# Patient Record
Sex: FEMALE | Race: BLACK OR AFRICAN AMERICAN | NOT HISPANIC OR LATINO | Employment: OTHER | URBAN - METROPOLITAN AREA
[De-identification: names, ages, dates, MRNs, and addresses within clinical notes are randomized per-mention and may not be internally consistent; named-entity substitution may affect disease eponyms.]

---

## 2018-10-28 ENCOUNTER — HISTORICAL (OUTPATIENT)
Dept: ADMINISTRATIVE | Facility: HOSPITAL | Age: 73
End: 2018-10-28

## 2018-10-31 LAB — FINAL CULTURE: NO GROWTH

## 2018-11-18 ENCOUNTER — HOSPITAL ENCOUNTER (OUTPATIENT)
Dept: NUTRITION | Facility: HOSPITAL | Age: 73
End: 2018-11-30
Attending: INTERNAL MEDICINE | Admitting: INTERNAL MEDICINE

## 2018-11-18 LAB
BUN SERPL-MCNC: 9 MG/DL (ref 7–18)
CALCIUM SERPL-MCNC: 8.4 MG/DL (ref 8.5–10.1)
CHLORIDE SERPL-SCNC: 110 MMOL/L (ref 98–107)
CO2 SERPL-SCNC: 25 MMOL/L (ref 21–32)
CREAT SERPL-MCNC: 0.84 MG/DL (ref 0.55–1.02)
CREAT/UREA NIT SERPL: 10.7
ERYTHROCYTE [DISTWIDTH] IN BLOOD BY AUTOMATED COUNT: 19.7 % (ref 11.5–17)
GLUCOSE SERPL-MCNC: 106 MG/DL (ref 74–106)
HCT VFR BLD AUTO: 34.6 % (ref 37–47)
HGB BLD-MCNC: 10.5 GM/DL (ref 12–16)
MCH RBC QN AUTO: 30 PG (ref 27–31)
MCHC RBC AUTO-ENTMCNC: 30.3 GM/DL (ref 33–36)
MCV RBC AUTO: 98.9 FL (ref 80–94)
PLATELET # BLD AUTO: 371 X10(3)/MCL (ref 130–400)
PMV BLD AUTO: 10.6 FL (ref 9.4–12.4)
POTASSIUM SERPL-SCNC: 3.9 MMOL/L (ref 3.5–5.1)
RBC # BLD AUTO: 3.5 X10(6)/MCL (ref 4.2–5.4)
SODIUM SERPL-SCNC: 142 MMOL/L (ref 136–145)
WBC # SPEC AUTO: 8.8 X10(3)/MCL (ref 4.5–11.5)

## 2018-11-19 LAB
ABS NEUT (OLG): 11.89 X10(3)/MCL (ref 2.1–9.2)
APTT PPP: 31.2 SECOND(S) (ref 24.8–36.9)
BASOPHILS # BLD AUTO: 0 X10(3)/MCL (ref 0–0.2)
BASOPHILS NFR BLD AUTO: 0 %
BUN SERPL-MCNC: 7 MG/DL (ref 7–18)
CALCIUM SERPL-MCNC: 8.8 MG/DL (ref 8.5–10.1)
CHLORIDE SERPL-SCNC: 109 MMOL/L (ref 98–107)
CO2 SERPL-SCNC: 24 MMOL/L (ref 21–32)
CREAT SERPL-MCNC: 0.77 MG/DL (ref 0.55–1.02)
CREAT/UREA NIT SERPL: 9.1
EOSINOPHIL # BLD AUTO: 0.2 X10(3)/MCL (ref 0–0.9)
EOSINOPHIL NFR BLD AUTO: 1 %
ERYTHROCYTE [DISTWIDTH] IN BLOOD BY AUTOMATED COUNT: 19.7 % (ref 11.5–17)
FERRITIN SERPL-MCNC: 768.3 NG/ML (ref 8–388)
GLUCOSE SERPL-MCNC: 127 MG/DL (ref 74–106)
H PYLORI AB SER IA-ACNC: NEGATIVE
HCT VFR BLD AUTO: 36.4 % (ref 37–47)
HGB BLD-MCNC: 11.1 GM/DL (ref 12–16)
INR PPP: 1.2 (ref 0–1.3)
IRON SATN MFR SERPL: 34.3 % (ref 20–50)
IRON SERPL-MCNC: 71 MCG/DL (ref 50–175)
LYMPHOCYTES # BLD AUTO: 0.7 X10(3)/MCL (ref 0.6–4.6)
LYMPHOCYTES NFR BLD AUTO: 5 %
MCH RBC QN AUTO: 29.6 PG (ref 27–31)
MCHC RBC AUTO-ENTMCNC: 30.5 GM/DL (ref 33–36)
MCV RBC AUTO: 97.1 FL (ref 80–94)
MONOCYTES # BLD AUTO: 0.1 X10(3)/MCL (ref 0.1–1.3)
MONOCYTES NFR BLD AUTO: 1 %
NEUTROPHILS # BLD AUTO: 11.89 X10(3)/MCL (ref 2.1–9.2)
NEUTROPHILS NFR BLD AUTO: 92 %
PLATELET # BLD AUTO: 353 X10(3)/MCL (ref 130–400)
PMV BLD AUTO: 10.3 FL (ref 9.4–12.4)
POTASSIUM SERPL-SCNC: 3.5 MMOL/L (ref 3.5–5.1)
PROTHROMBIN TIME: 15.9 SECOND(S) (ref 12.2–14.7)
RBC # BLD AUTO: 3.75 X10(6)/MCL (ref 4.2–5.4)
SODIUM SERPL-SCNC: 143 MMOL/L (ref 136–145)
TIBC SERPL-MCNC: 207 MCG/DL (ref 250–450)
TRANSFERRIN SERPL-MCNC: 156 MG/DL (ref 200–360)
WBC # SPEC AUTO: 13 X10(3)/MCL (ref 4.5–11.5)

## 2018-11-20 LAB
ABS NEUT (OLG): 9.24 X10(3)/MCL (ref 2.1–9.2)
ALBUMIN SERPL-MCNC: 2.8 GM/DL (ref 3.4–5)
ALBUMIN/GLOB SERPL: 1.1 {RATIO}
ALP SERPL-CCNC: 83 UNIT/L (ref 38–126)
ALT SERPL-CCNC: 65 UNIT/L (ref 12–78)
AST SERPL-CCNC: 58 UNIT/L (ref 15–37)
BASOPHILS # BLD AUTO: 0 X10(3)/MCL (ref 0–0.2)
BASOPHILS NFR BLD AUTO: 0 %
BILIRUB SERPL-MCNC: 1.2 MG/DL (ref 0.2–1)
BILIRUBIN DIRECT+TOT PNL SERPL-MCNC: 0.4 MG/DL (ref 0–0.2)
BILIRUBIN DIRECT+TOT PNL SERPL-MCNC: 0.8 MG/DL (ref 0–0.8)
BUN SERPL-MCNC: 9 MG/DL (ref 7–18)
CALCIUM SERPL-MCNC: 8.6 MG/DL (ref 8.5–10.1)
CHLORIDE SERPL-SCNC: 109 MMOL/L (ref 98–107)
CO2 SERPL-SCNC: 25 MMOL/L (ref 21–32)
CREAT SERPL-MCNC: 0.82 MG/DL (ref 0.55–1.02)
EOSINOPHIL # BLD AUTO: 0.1 X10(3)/MCL (ref 0–0.9)
EOSINOPHIL NFR BLD AUTO: 1 %
ERYTHROCYTE [DISTWIDTH] IN BLOOD BY AUTOMATED COUNT: 19.7 % (ref 11.5–17)
GLOBULIN SER-MCNC: 2.6 GM/DL (ref 2.4–3.5)
GLUCOSE SERPL-MCNC: 83 MG/DL (ref 74–106)
HCT VFR BLD AUTO: 31.9 % (ref 37–47)
HGB BLD-MCNC: 10.1 GM/DL (ref 12–16)
LYMPHOCYTES # BLD AUTO: 1.1 X10(3)/MCL (ref 0.6–4.6)
LYMPHOCYTES NFR BLD AUTO: 9 %
MCH RBC QN AUTO: 30.2 PG (ref 27–31)
MCHC RBC AUTO-ENTMCNC: 31.7 GM/DL (ref 33–36)
MCV RBC AUTO: 95.5 FL (ref 80–94)
MONOCYTES # BLD AUTO: 1 X10(3)/MCL (ref 0.1–1.3)
MONOCYTES NFR BLD AUTO: 9 %
NEUTROPHILS # BLD AUTO: 9.24 X10(3)/MCL (ref 2.1–9.2)
NEUTROPHILS NFR BLD AUTO: 80 %
PLATELET # BLD AUTO: 278 X10(3)/MCL (ref 130–400)
PMV BLD AUTO: 10.7 FL (ref 9.4–12.4)
POTASSIUM SERPL-SCNC: 3.6 MMOL/L (ref 3.5–5.1)
PROT SERPL-MCNC: 5.4 GM/DL (ref 6.4–8.2)
RBC # BLD AUTO: 3.34 X10(6)/MCL (ref 4.2–5.4)
SODIUM SERPL-SCNC: 143 MMOL/L (ref 136–145)
WBC # SPEC AUTO: 11.6 X10(3)/MCL (ref 4.5–11.5)

## 2018-11-21 LAB
INR PPP: 1.3 (ref 0–1.3)
PROTHROMBIN TIME: 16.4 SECOND(S) (ref 12.2–14.7)

## 2018-11-23 LAB
ABS NEUT (OLG): 10.07 X10(3)/MCL (ref 2.1–9.2)
BASOPHILS # BLD AUTO: 0 X10(3)/MCL (ref 0–0.2)
BASOPHILS NFR BLD AUTO: 0 %
CROSSMATCH INTERPRETATION: NORMAL
EOSINOPHIL # BLD AUTO: 0.1 X10(3)/MCL (ref 0–0.9)
EOSINOPHIL NFR BLD AUTO: 1 %
ERYTHROCYTE [DISTWIDTH] IN BLOOD BY AUTOMATED COUNT: 19.5 % (ref 11.5–17)
GROUP & RH: NORMAL
HCT VFR BLD AUTO: 27.6 % (ref 37–47)
HGB BLD-MCNC: 8.4 GM/DL (ref 12–16)
INR PPP: 1.6 (ref 0–1.3)
LYMPHOCYTES # BLD AUTO: 1 X10(3)/MCL (ref 0.6–4.6)
LYMPHOCYTES NFR BLD AUTO: 9 %
MCH RBC QN AUTO: 29.4 PG (ref 27–31)
MCHC RBC AUTO-ENTMCNC: 30.4 GM/DL (ref 33–36)
MCV RBC AUTO: 96.5 FL (ref 80–94)
MONOCYTES # BLD AUTO: 0.9 X10(3)/MCL (ref 0.1–1.3)
MONOCYTES NFR BLD AUTO: 7 %
NEUTROPHILS # BLD AUTO: 10.07 X10(3)/MCL (ref 2.1–9.2)
NEUTROPHILS NFR BLD AUTO: 83 %
NRBC BLD AUTO-RTO: 0.2 % (ref 0–0.2)
PLATELET # BLD AUTO: 182 X10(3)/MCL (ref 130–400)
PMV BLD AUTO: 11.3 FL (ref 9.4–12.4)
PRODUCT READY: NORMAL
PROTHROMBIN TIME: 19.5 SECOND(S) (ref 12.2–14.7)
RBC # BLD AUTO: 2.86 X10(6)/MCL (ref 4.2–5.4)
WBC # SPEC AUTO: 12.2 X10(3)/MCL (ref 4.5–11.5)

## 2018-11-24 LAB
ABS NEUT (OLG): 5.99 X10(3)/MCL (ref 2.1–9.2)
BASOPHILS # BLD AUTO: 0 X10(3)/MCL (ref 0–0.2)
BASOPHILS NFR BLD AUTO: 0 %
EOSINOPHIL # BLD AUTO: 0.3 X10(3)/MCL (ref 0–0.9)
EOSINOPHIL NFR BLD AUTO: 3 %
ERYTHROCYTE [DISTWIDTH] IN BLOOD BY AUTOMATED COUNT: 19.9 % (ref 11.5–17)
FINAL CULTURE: NORMAL
FINAL CULTURE: NORMAL
HCT VFR BLD AUTO: 27.3 % (ref 37–47)
HGB BLD-MCNC: 8.5 GM/DL (ref 12–16)
INR PPP: 2 (ref 0–1.3)
LYMPHOCYTES # BLD AUTO: 1.9 X10(3)/MCL (ref 0.6–4.6)
LYMPHOCYTES NFR BLD AUTO: 21 %
MCH RBC QN AUTO: 29.8 PG (ref 27–31)
MCHC RBC AUTO-ENTMCNC: 31.1 GM/DL (ref 33–36)
MCV RBC AUTO: 95.8 FL (ref 80–94)
MONOCYTES # BLD AUTO: 1 X10(3)/MCL (ref 0.1–1.3)
MONOCYTES NFR BLD AUTO: 11 %
NEUTROPHILS # BLD AUTO: 5.99 X10(3)/MCL (ref 2.1–9.2)
NEUTROPHILS NFR BLD AUTO: 64 %
PLATELET # BLD AUTO: 163 X10(3)/MCL (ref 130–400)
PMV BLD AUTO: 11.4 FL (ref 9.4–12.4)
PROTHROMBIN TIME: 23.7 SECOND(S) (ref 12.2–14.7)
RBC # BLD AUTO: 2.85 X10(6)/MCL (ref 4.2–5.4)
WBC # SPEC AUTO: 9.3 X10(3)/MCL (ref 4.5–11.5)

## 2018-11-25 LAB
INR PPP: 2.9 (ref 0–1.3)
PROTHROMBIN TIME: 30.9 SECOND(S) (ref 12.2–14.7)

## 2018-11-26 LAB
ABS NEUT (OLG): 7.45 X10(3)/MCL (ref 2.1–9.2)
BASOPHILS # BLD AUTO: 0 X10(3)/MCL (ref 0–0.2)
BASOPHILS NFR BLD AUTO: 0 %
EOSINOPHIL # BLD AUTO: 0.2 X10(3)/MCL (ref 0–0.9)
EOSINOPHIL NFR BLD AUTO: 2 %
ERYTHROCYTE [DISTWIDTH] IN BLOOD BY AUTOMATED COUNT: 19.7 % (ref 11.5–17)
HCT VFR BLD AUTO: 27.2 % (ref 37–47)
HGB BLD-MCNC: 8.4 GM/DL (ref 12–16)
INR PPP: 4.8 (ref 0–1.3)
LYMPHOCYTES # BLD AUTO: 1.2 X10(3)/MCL (ref 0.6–4.6)
LYMPHOCYTES NFR BLD AUTO: 12 %
MCH RBC QN AUTO: 30.1 PG (ref 27–31)
MCHC RBC AUTO-ENTMCNC: 30.9 GM/DL (ref 33–36)
MCV RBC AUTO: 97.5 FL (ref 80–94)
MONOCYTES # BLD AUTO: 0.7 X10(3)/MCL (ref 0.1–1.3)
MONOCYTES NFR BLD AUTO: 8 %
NEUTROPHILS # BLD AUTO: 7.45 X10(3)/MCL (ref 2.1–9.2)
NEUTROPHILS NFR BLD AUTO: 77 %
PLATELET # BLD AUTO: 185 X10(3)/MCL (ref 130–400)
PMV BLD AUTO: 11.1 FL (ref 9.4–12.4)
PROTHROMBIN TIME: 46.3 SECOND(S) (ref 12.2–14.7)
RBC # BLD AUTO: 2.79 X10(6)/MCL (ref 4.2–5.4)
WBC # SPEC AUTO: 9.7 X10(3)/MCL (ref 4.5–11.5)

## 2018-11-27 LAB
INR PPP: 4.3 (ref 0–1.3)
PROTHROMBIN TIME: 42.4 SECOND(S) (ref 12.2–14.7)

## 2018-11-28 LAB
ABS NEUT (OLG): 5.71 X10(3)/MCL (ref 2.1–9.2)
ALBUMIN SERPL-MCNC: 2.5 GM/DL (ref 3.4–5)
ALBUMIN/GLOB SERPL: 0.9 RATIO (ref 1.1–2)
ALP SERPL-CCNC: 116 UNIT/L (ref 38–126)
ALT SERPL-CCNC: 52 UNIT/L (ref 12–78)
AST SERPL-CCNC: 24 UNIT/L (ref 15–37)
BASOPHILS # BLD AUTO: 0 X10(3)/MCL (ref 0–0.2)
BASOPHILS NFR BLD AUTO: 0 %
BILIRUB SERPL-MCNC: 0.8 MG/DL (ref 0.2–1)
BILIRUBIN DIRECT+TOT PNL SERPL-MCNC: 0.3 MG/DL (ref 0–0.5)
BILIRUBIN DIRECT+TOT PNL SERPL-MCNC: 0.5 MG/DL (ref 0–0.8)
BUN SERPL-MCNC: 7 MG/DL (ref 7–18)
CALCIUM SERPL-MCNC: 7.9 MG/DL (ref 8.5–10.1)
CHLORIDE SERPL-SCNC: 110 MMOL/L (ref 98–107)
CO2 SERPL-SCNC: 28 MMOL/L (ref 21–32)
CREAT SERPL-MCNC: 0.85 MG/DL (ref 0.55–1.02)
EOSINOPHIL # BLD AUTO: 0.2 X10(3)/MCL (ref 0–0.9)
EOSINOPHIL NFR BLD AUTO: 2 %
ERYTHROCYTE [DISTWIDTH] IN BLOOD BY AUTOMATED COUNT: 20 % (ref 11.5–17)
GLOBULIN SER-MCNC: 2.9 GM/DL (ref 2.4–3.5)
GLUCOSE SERPL-MCNC: 154 MG/DL (ref 74–106)
HCT VFR BLD AUTO: 28 % (ref 37–47)
HGB BLD-MCNC: 8.3 GM/DL (ref 12–16)
INR PPP: 5 (ref 0–1.3)
LYMPHOCYTES # BLD AUTO: 1.3 X10(3)/MCL (ref 0.6–4.6)
LYMPHOCYTES NFR BLD AUTO: 17 %
MCH RBC QN AUTO: 29.3 PG (ref 27–31)
MCHC RBC AUTO-ENTMCNC: 29.6 GM/DL (ref 33–36)
MCV RBC AUTO: 98.9 FL (ref 80–94)
MONOCYTES # BLD AUTO: 0.6 X10(3)/MCL (ref 0.1–1.3)
MONOCYTES NFR BLD AUTO: 8 %
NEUTROPHILS # BLD AUTO: 5.71 X10(3)/MCL (ref 2.1–9.2)
NEUTROPHILS NFR BLD AUTO: 72 %
NRBC BLD AUTO-RTO: 0.5 % (ref 0–0.2)
PLATELET # BLD AUTO: 193 X10(3)/MCL (ref 130–400)
PMV BLD AUTO: 11.5 FL (ref 9.4–12.4)
POTASSIUM SERPL-SCNC: 3 MMOL/L (ref 3.5–5.1)
PROT SERPL-MCNC: 5.4 GM/DL (ref 6.4–8.2)
PROTHROMBIN TIME: 47.8 SECOND(S) (ref 12.2–14.7)
RBC # BLD AUTO: 2.83 X10(6)/MCL (ref 4.2–5.4)
SODIUM SERPL-SCNC: 144 MMOL/L (ref 136–145)
WBC # SPEC AUTO: 7.9 X10(3)/MCL (ref 4.5–11.5)

## 2018-11-29 LAB
ALBUMIN SERPL-MCNC: 2.6 GM/DL (ref 3.4–5)
BUN SERPL-MCNC: 6 MG/DL (ref 7–18)
CALCIUM SERPL-MCNC: 8.2 MG/DL (ref 8.5–10.1)
CHLORIDE SERPL-SCNC: 109 MMOL/L (ref 98–107)
CO2 SERPL-SCNC: 27 MMOL/L (ref 21–32)
CREAT SERPL-MCNC: 0.67 MG/DL (ref 0.55–1.02)
ERYTHROCYTE [DISTWIDTH] IN BLOOD BY AUTOMATED COUNT: 19.7 % (ref 11.5–17)
GLUCOSE SERPL-MCNC: 92 MG/DL (ref 74–106)
HCT VFR BLD AUTO: 27.3 % (ref 37–47)
HGB BLD-MCNC: 8.2 GM/DL (ref 12–16)
INR PPP: 5.2 (ref 0–1.3)
MAGNESIUM SERPL-MCNC: 1.8 MG/DL (ref 1.8–2.4)
MCH RBC QN AUTO: 29.6 PG (ref 27–31)
MCHC RBC AUTO-ENTMCNC: 30 GM/DL (ref 33–36)
MCV RBC AUTO: 98.6 FL (ref 80–94)
PHOSPHATE SERPL-MCNC: 2.6 MG/DL (ref 2.5–4.9)
PLATELET # BLD AUTO: 206 X10(3)/MCL (ref 130–400)
PMV BLD AUTO: 10.6 FL (ref 9.4–12.4)
POTASSIUM SERPL-SCNC: 3.1 MMOL/L (ref 3.5–5.1)
PROTHROMBIN TIME: 49.3 SECOND(S) (ref 12.2–14.7)
RBC # BLD AUTO: 2.77 X10(6)/MCL (ref 4.2–5.4)
SODIUM SERPL-SCNC: 145 MMOL/L (ref 136–145)
WBC # SPEC AUTO: 8.8 X10(3)/MCL (ref 4.5–11.5)

## 2018-11-30 LAB
CRP SERPL HS-MCNC: 57.7 MG/L (ref 0–3)
ERYTHROCYTE [SEDIMENTATION RATE] IN BLOOD: 38 MM/HR (ref 0–20)
INR PPP: 2.6 (ref 0–1.3)
PROTHROMBIN TIME: 28.4 SECOND(S) (ref 12.2–14.7)

## 2018-12-01 LAB — FINAL CULTURE: NORMAL

## 2018-12-05 LAB
FINAL CULTURE: NORMAL
FINAL CULTURE: NORMAL

## 2019-10-10 ENCOUNTER — HISTORICAL (OUTPATIENT)
Dept: CARDIOLOGY | Facility: HOSPITAL | Age: 74
End: 2019-10-10

## 2019-10-21 ENCOUNTER — HISTORICAL (OUTPATIENT)
Dept: CARDIOLOGY | Facility: HOSPITAL | Age: 74
End: 2019-10-21

## 2019-11-19 ENCOUNTER — HISTORICAL (OUTPATIENT)
Dept: CARDIOLOGY | Facility: HOSPITAL | Age: 74
End: 2019-11-19

## 2019-12-16 ENCOUNTER — HISTORICAL (OUTPATIENT)
Dept: RADIOLOGY | Facility: HOSPITAL | Age: 74
End: 2019-12-16

## 2020-01-06 ENCOUNTER — HISTORICAL (OUTPATIENT)
Dept: CARDIOLOGY | Facility: HOSPITAL | Age: 75
End: 2020-01-06

## 2020-01-23 ENCOUNTER — HISTORICAL (OUTPATIENT)
Dept: CARDIOLOGY | Facility: HOSPITAL | Age: 75
End: 2020-01-23

## 2020-01-23 ENCOUNTER — HISTORICAL (OUTPATIENT)
Dept: RADIOLOGY | Facility: HOSPITAL | Age: 75
End: 2020-01-23

## 2020-02-03 ENCOUNTER — HISTORICAL (OUTPATIENT)
Dept: CARDIOLOGY | Facility: HOSPITAL | Age: 75
End: 2020-02-03

## 2020-02-24 ENCOUNTER — HISTORICAL (OUTPATIENT)
Dept: CARDIOLOGY | Facility: HOSPITAL | Age: 75
End: 2020-02-24

## 2020-03-24 ENCOUNTER — HISTORICAL (OUTPATIENT)
Dept: CARDIOLOGY | Facility: HOSPITAL | Age: 75
End: 2020-03-24

## 2020-04-21 ENCOUNTER — HISTORICAL (OUTPATIENT)
Dept: CARDIOLOGY | Facility: HOSPITAL | Age: 75
End: 2020-04-21

## 2020-05-12 ENCOUNTER — HISTORICAL (OUTPATIENT)
Dept: CARDIOLOGY | Facility: HOSPITAL | Age: 75
End: 2020-05-12

## 2020-06-09 ENCOUNTER — HISTORICAL (OUTPATIENT)
Dept: CARDIOLOGY | Facility: HOSPITAL | Age: 75
End: 2020-06-09

## 2020-06-30 ENCOUNTER — HISTORICAL (OUTPATIENT)
Dept: CARDIOLOGY | Facility: HOSPITAL | Age: 75
End: 2020-06-30

## 2020-07-14 ENCOUNTER — HISTORICAL (OUTPATIENT)
Dept: CARDIOLOGY | Facility: HOSPITAL | Age: 75
End: 2020-07-14

## 2020-08-11 ENCOUNTER — HISTORICAL (OUTPATIENT)
Dept: CARDIOLOGY | Facility: HOSPITAL | Age: 75
End: 2020-08-11

## 2020-08-25 ENCOUNTER — HISTORICAL (OUTPATIENT)
Dept: CARDIOLOGY | Facility: HOSPITAL | Age: 75
End: 2020-08-25

## 2020-09-17 ENCOUNTER — HISTORICAL (OUTPATIENT)
Dept: CARDIOLOGY | Facility: HOSPITAL | Age: 75
End: 2020-09-17

## 2020-10-15 ENCOUNTER — HISTORICAL (OUTPATIENT)
Dept: CARDIOLOGY | Facility: HOSPITAL | Age: 75
End: 2020-10-15

## 2020-11-12 ENCOUNTER — HISTORICAL (OUTPATIENT)
Dept: CARDIOLOGY | Facility: HOSPITAL | Age: 75
End: 2020-11-12

## 2020-12-10 ENCOUNTER — HISTORICAL (OUTPATIENT)
Dept: CARDIOLOGY | Facility: HOSPITAL | Age: 75
End: 2020-12-10

## 2021-01-07 ENCOUNTER — HISTORICAL (OUTPATIENT)
Dept: CARDIOLOGY | Facility: HOSPITAL | Age: 76
End: 2021-01-07

## 2021-01-21 ENCOUNTER — HISTORICAL (OUTPATIENT)
Dept: CARDIOLOGY | Facility: HOSPITAL | Age: 76
End: 2021-01-21

## 2021-02-18 ENCOUNTER — HISTORICAL (OUTPATIENT)
Dept: CARDIOLOGY | Facility: HOSPITAL | Age: 76
End: 2021-02-18

## 2021-03-18 ENCOUNTER — HISTORICAL (OUTPATIENT)
Dept: CARDIOLOGY | Facility: HOSPITAL | Age: 76
End: 2021-03-18

## 2021-04-14 ENCOUNTER — HISTORICAL (OUTPATIENT)
Dept: CARDIOLOGY | Facility: HOSPITAL | Age: 76
End: 2021-04-14

## 2021-05-12 ENCOUNTER — HISTORICAL (OUTPATIENT)
Dept: CARDIOLOGY | Facility: HOSPITAL | Age: 76
End: 2021-05-12

## 2021-06-16 ENCOUNTER — HISTORICAL (OUTPATIENT)
Dept: CARDIOLOGY | Facility: HOSPITAL | Age: 76
End: 2021-06-16

## 2021-06-22 ENCOUNTER — HISTORICAL (OUTPATIENT)
Dept: CARDIOLOGY | Facility: HOSPITAL | Age: 76
End: 2021-06-22

## 2021-07-13 ENCOUNTER — HISTORICAL (OUTPATIENT)
Dept: CARDIOLOGY | Facility: HOSPITAL | Age: 76
End: 2021-07-13

## 2021-07-28 ENCOUNTER — HISTORICAL (OUTPATIENT)
Dept: CARDIOLOGY | Facility: HOSPITAL | Age: 76
End: 2021-07-28

## 2021-08-11 ENCOUNTER — HISTORICAL (OUTPATIENT)
Dept: CARDIOLOGY | Facility: HOSPITAL | Age: 76
End: 2021-08-11

## 2021-09-08 ENCOUNTER — HISTORICAL (OUTPATIENT)
Dept: CARDIOLOGY | Facility: HOSPITAL | Age: 76
End: 2021-09-08

## 2021-10-06 ENCOUNTER — HISTORICAL (OUTPATIENT)
Dept: CARDIOLOGY | Facility: HOSPITAL | Age: 76
End: 2021-10-06

## 2021-10-13 ENCOUNTER — HISTORICAL (OUTPATIENT)
Dept: CARDIOLOGY | Facility: HOSPITAL | Age: 76
End: 2021-10-13

## 2021-11-10 ENCOUNTER — HISTORICAL (OUTPATIENT)
Dept: CARDIOLOGY | Facility: HOSPITAL | Age: 76
End: 2021-11-10

## 2021-12-07 ENCOUNTER — HISTORICAL (OUTPATIENT)
Dept: CARDIOLOGY | Facility: HOSPITAL | Age: 76
End: 2021-12-07

## 2022-01-04 ENCOUNTER — HISTORICAL (OUTPATIENT)
Dept: CARDIOLOGY | Facility: HOSPITAL | Age: 77
End: 2022-01-04

## 2022-02-08 ENCOUNTER — HISTORICAL (OUTPATIENT)
Dept: CARDIOLOGY | Facility: HOSPITAL | Age: 77
End: 2022-02-08

## 2022-03-08 ENCOUNTER — HISTORICAL (OUTPATIENT)
Dept: CARDIOLOGY | Facility: HOSPITAL | Age: 77
End: 2022-03-08

## 2022-04-05 ENCOUNTER — HISTORICAL (OUTPATIENT)
Dept: CARDIOLOGY | Facility: HOSPITAL | Age: 77
End: 2022-04-05

## 2022-04-29 NOTE — CONSULTS
DATE OF CONSULTATION:      ATTENDING PHYSICIAN:  Hortensia Mari MD  CONSULTING PHYSICIAN:  Wilfredo West MD    HISTORY:  This is a very pleasant 73-year-old  female who was transferred from LT for anemia and black stools.  She was found to be heme positive.  She has had a fairly constant hematocrit and it actually was 34 today.  She has not had any, otherwise, gross bleeding that she has been aware of.  She is a patient of Dr. Perez.  She previously, in June of 2016, did a colonoscopy that showed internal hemorrhoids and left-sided diverticula, but no evidence of polyps, masses, or other issues.  She also had an upper endoscopy at that time for belching and all was normal with the exception of some mild antral gastritis.     Over the past couple of months, she has gone through a number of changes in her health starting off with embolic stroke and a non-ST-elevation myocardial infarction.  A CRISPIN was done that showed mitral valve vegetation.  She had a previous prosthetic valve that had to be replaced with a [facility] [place] valve.  She had a 2 vessel CABG and drainage of an abscess at the site of the valve.  She has been slow to recover due to the severity of her illness.  She had prolonged intubation.  She, of course, is on blood thinners because of her valve and also due to atrial fibrillation.  She has been at LTAC at the [facility] and when she was found to be heme-positive on a single stool along with dark stools, it was felt that she needed to be managed at [facility], so she was transferred and we were consulted.     Presently, she has no specific GI complaints.  She denies any dysphagia except to large vitamins that she sometimes takes.  She has had a little nausea but no vomiting.  Really no complaints of abdominal pain except for where she has been receiving injections of Lovenox.  Her stools, she says, have been greenish-brown, maybe somewhat dark, but she had not  been aware of any gross bleeding.  Her weight has been stable.  No particular food intolerances.    PAST MEDICAL HISTORY:    1. Hypertension.  2. Dyslipidemia.  3. Recent stroke.  4. Valve replacement.  5. Coronary artery disease with bypass surgery.  6. Atrial fibrillation.  7. Previous total hysterectomy.  8. Previous appendectomy.    ALLERGIES:  Iodine and vancomycin.      CURRENT MEDICATIONS:    1. __________ IV push once today.    2. Amiodarone 200 mg daily.    3. Atorvastatin 40 mg daily.  4. Citalopram 20 mg daily.  5. Lovenox 60 mg b.i.d.  6. Metoprolol 100 mg b.i.d.  7. Pantoprazole IV b.i.d.   8. Bactrim b.i.d.   9. Xanax p.r.n.  10. Zofran p.r.n.    SOCIAL HISTORY:  She denies any alcohol or tobacco or substance abuse.    FAMILY HISTORY:  Diabetes, but no major GI issues.    REVIEW OF SYSTEMS:  As noted above, otherwise negative.    EXAMINATION:  GENERAL:  Very pleasant, well-informed and talkative female.     VITAL SIGNS:  Blood pressure 154/81, temperature 36, heart rate 79, O2 sat 97 on room air.   HEENT:  No scleral icterus.  Oropharynx without obvious lesions.   NECK:  Without abnormalities.   LUNGS:  Appear clear.   HEART:  Irregularly irregular.  No obvious murmur.     GASTROINTESTINAL:  Her abdomen is soft, minimally tender in the lower abdomen over the injection areas.  Epigastrium is nontender.  Abdomen is nondistended with good bowel sounds.  No organomegaly.   EXTREMITIES:  With no edema.   NEUROLOGIC:  She is alert, oriented, and appropriate.    LABORATORY:  Her white count was 8.8, hemoglobin 10.5, hematocrit 34.6.  Previously, her hematocrit was 30 and stable over the last week.  Platelets 371.  Her INR was 1.4 as of yesterday.  Her BUN is 9, her creatinine 0.84.  Her most recent liver functions 1 week ago were normal.  Stool was heme positive 3 times between the 10th and 12th of November.    IMPRESSION:    1. Heme-positive stool with anemia.  Her hematocrit is fairly stable at this point  and she is not experiencing an active acute gastrointestinal bleed at this time.  In fact, it would almost suggest that it has stopped because her hematocrit is improving.  However, given the need for long-term anticoagulation and the reported black stools, we will plan for an upper endoscopy tomorrow with Dr. Perez.  Whether or not she would need any additional testing, I would doubt at this time given her negative colonoscopy 2 years ago.  2. Cholelithiasis.  This has been noted of recent on abdominal imaging.  Ultrasound confirmed this.  She also ended up having a HIDA scan that showed normal filling of the gallbladder.  Her liver functions have been normal.  Right now, she is not having any biliary colic.  I would just simply observe this.  3. Diverticulosis.  She has a history of this based on previous colonoscopy.  4. Significant cardiac issues and need for ongoing long-term anticoagulation.        ______________________________  MD PRASANTH Hicks/NICOLE  DD:  11/18/2018  Time:  03:34PM  DT:  11/18/2018  Time:  04:05PM  Job #:  [id number]

## 2022-04-29 NOTE — DISCHARGE SUMMARY
Patient:   Hafsa Jimenez            MRN: [id number]            FIN: [id number]               Age:   73 years     Sex:  Female     :  1945   Associated Diagnoses:   None   Author:   Memo WEBER, Neal      Discharge Information      Discharge Summary Information   Admit/Discharge Dates   Admit Date: 2018  Discharge Date: 2018      Physicians   Attending Physician - Memo WEBER, Neal  Admitting Physician - Kamaljit WEBER, Hortensia  Consulting Physician - Chris WEBER, Manish  Primary Care Physician - Carlos Flores MD      Discharge Diagnosis   ? 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-treatment completed  Essential Hypertension  CAD s/p CABG in 2018  History of embolic CVA  UTI with Enterobacter cloacae, resolved     Procedures   2018 - [id number] - Esophagogastroduodenoscopy   Discharge Medications   Prescribed  labetalol (labetalol 5 mg/mL intravenous solution) 10 mg, IV Push, q4hr, PRN hypertension  warfarin (Coumadin 5 mg oral tablet) 5 mg, Oral, Daily  zinc oxide topical (David Butt Paste) 1 dionte, TOP, q2hr, PRN rash  Continue  Dr Lowery's Crack Cream  acetaminophen (Tylenol 325 mg oral tablet) 650 mg, Oral, q4hr, PRN pain, mild  alPRAzolam (Xanax 0.25 mg oral tablet) 0.25 mg, Oral, TID, PRN as needed for anxiety  aldioxa-chloroxylenol topical (ZeaSORB) 1 application, TOP  amiodarone (amiodarone 200 mg oral Tab) 200 mg, Oral, Daily  aspirin (aspirin 81 mg oral tablet, CHEWABLE) 81 mg, Oral, Daily  atorvastatin (atorvastatin 40 mg oral tablet) 40 mg, Oral, Daily  calcium-vitamin D (Calcium 600+D) 2 tab(s), Oral, Daily  citalopram (Celexa 20 mg oral tablet) 20 mg, Oral, Daily  docusate (docusate sodium 50 mg oral capsule) 50 mg, Oral, Daily, PRN as needed for constipation  enoxaparin (Lovenox) 80 mg, Subcutaneous, BID  furosemide (Lasix  20 mg oral tablet) 20 mg, Oral, Daily  metoprolol (metoprolol tartrate 50 mg oral tab) 100 mg, Oral, BID  niacin (Niacin  mg oral capsule, extended release) 500 mg, Oral, Once a day (at bedtime)  omega-3 polyunsaturated fatty acids (Fish Oil oral capsule) 1 cap(s), Oral, BID  ondansetron (Zofran ODT 4 mg oral tablet, disintegrating) 4 mg, Oral, q8hr, PRN nausea/vomiting  ondansetron (Zofran ODT 4 mg oral tablet, disintegrating) 4 mg, Oral, q8hr, PRN as needed for nausea/vomiting  ondansetron (Zofran ODT 4 mg oral tablet, disintegrating) 4 mg, Oral, q8hr, PRN nausea/vomiting  ondansetron (Zofran ODT 4 mg oral tablet, disintegrating) 4 mg, Oral, q8hr, PRN as needed for nausea/vomiting  ondansetron (Zofran ODT 4 mg oral tablet, disintegrating) 4 mg, Oral, q8hr, PRN nausea/vomiting  ondansetron (Zofran ODT 4 mg oral tablet, disintegrating) 4 mg, Oral, q8hr, PRN as needed for nausea/vomiting  oxyCODONE (oxycodone 5 mg oral tablet) 5 mg, Oral, q4hr, PRN pain, moderate  oxyCODONE (oxycodone 5 mg oral tablet) 10 mg, Oral, q6hr, PRN pain, severe  oxyCODONE (oxycodone 5 mg oral tablet) 5 mg, Oral, q4hr, PRN pain, moderate  oxyCODONE (oxycodone 5 mg oral tablet) 10 mg, Oral, q6hr, PRN pain, severe  oxyCODONE (oxycodone 5 mg oral tablet) 5 mg, Oral, q4hr, PRN pain, moderate  oxyCODONE (oxycodone 5 mg oral tablet) 10 mg, Oral, q6hr, PRN pain, severe  pantoprazole (Pantoprazole 40 mg ORAL EC-Tablet) 40 mg, Oral, Daily  promethazine (Phenergan IV Additive) 6.25, IV, q4hr, PRN as needed for nausea/vomiting  ramipril (ramipril 5 mg oral capsule) 5 mg, Oral, Daily  Discontinue  cefTRIAXone 2 gm, IV Piggyback, q24hr  diltiazem (DILTIAZEM 24HR  MG CAP) 240 mg, Oral, Daily  diltiazem (DILTIAZEM 24HR  MG CAP)  diltiazem (DILTIAZEM 24HR  MG CAP) 240 mg, Oral, Daily  diltiazem (DILTIAZEM 24HR  MG CAP)  diltiazem (DILTIAZEM 24HR  MG CAP) 240 mg, Oral, Daily  diltiazem (DILTIAZEM 24HR  MG  CAP)  diphenhydrAMINE (Benadryl (for IV Push)) 12.5 mg, IV, q6hr, PRN itching  docusate (docusate 10 mg/mL oral liquid) 50 mg, Oral, Daily  docusate (docusate sodium 10 mg/mL oral liquid) 50 mg, Oral, Daily, PRN other (see comment)  fexofenadine (Allegra) 180 mg, Oral, Daily  hydrALAZINE (hydrALAZINE (Apresoline) Inj.) 20 mg, IV, q2hr, PRN hypertension  levoFLOXacin (Levaquin 500 mg oral tablet) 500 mg, Oral, Daily  linezolid (Zyvox) 600 mg, IV, BID  metoprolol (metoprolol tartrate 1 mg/mL injectable sol) 5 mg, IV, q6hr, PRN tachycardia  metoprolol (metoprolol tartrate 25 mg oral tab) 25 mg, Oral, BID  ondansetron (Zofran 2 mg/mL injectable solution) 8 mg, IV, q4hr  promethazine (Phenergan IV Additive) 25 mg, IV, q4hr, PRN as needed for nausea/vomiting  rosuvastatin (Crestor 10 mg oral tablet) 10 mg, Oral, Once a day (at bedtime)  sotalol (sotalol 80 mg oral tablet) 80 mg, Oral, BID  warfarin (Coumadin 5 mg oral tablet) 5 mg, Oral, Daily      Education   What You Need to Know About Warfarin  Discharge - 11/20/18 7:42:00 CST, DC/Trans to LTAC [facility], Give all scheduled vaccinations prior to discharge.   Discharge Activity - Activity as Tolerated   Discharge Diet - Low Sodium       Hospital Course   Patient is a 73 year old woman who was transferred from AC for anemia and black stools . Patient was recently hospitalized in September and October of this year initially for an embolic stroke and non-ST elevation MI. Cardiology was following, patient got CRISPIN done and that showed mitral valve vegetation. Patient underwent prosthetic mitral valve replacement with drainage of abscess and double CABG on 9/26/18.She was doing well postop but then went into a flutter with RVR was started on beta blockers and calcium channel blockers transferred to ICU. She was intubated because of pulmonary edema also had some pleural effusion for which she had thoracentesis and was finally extubated on October 9, 2018. ID was consulted patient  was on Merrem and Zyvox and they recommended 6 weeks of IV antibiotics and for that patient was transferred to [facility]. Patient was also started on Coumadin because of her embolic stroke and history of A. fib but she started having some black stools occult blood was ordered that was positive ×1. Cardiology was consulted who recommended continuing anticoagulation as patient is at pretty high risk for stroke. GI was consulted to who recommended transfer to the main [place] so she was transferred to [facility]. Dr. Davis is her gastroenterologist and was consulted. She was then discharged to LTAC on 6 weeks of IV antibiotics which was completed in Nov. Was tolerating PT well and had not had any recurrences of afib. She has been on Lovenox 80 mg subq. Coumadin has been held since noted GI bleed. She also had some complaints of nausea for the past two weeks. Denies any fever, chills or abdominal pain. Patient was admitted to hospitalist services with plans for endoscopy in the AM with Dr. Davis. The patient's Hb stabilized, and her stools were brown in color without any blood. She complained of nausea that began 11/19/18, and she was given Zofran which resolved nausea. The EGD evidenced a small hiatal hernia, a small nodule in the right vocal cord, venous blebs versus esophageal varices, and erosive gastritis. The patient remained with a stable H&H, and no new complaints. Patient was not discharged yesterday. LTAC would not take her back, and case management was working on placement for home health with PT.   On the day of discharge the patient was seen, awake, and comfortable. No new complaints at this time. No acute events overnight. She will be discharged today to home health with PT on Lovenox shots and coumadin, INR should be 2.5 to 3.5. All other comorbidities and conditions remained stable throughout hospital stay. Pt was seen by surgery team for left lower quadrant superfical  hematoma. Advised warm compressors. No surgical intervention recommended.   Discharge Duration = 31 minutes      Physical Examination   General: Alert and oriented, No acute distress,  Respiratory: Lungs are clear to auscultation, Breath sounds are equal.   Cardiovascular: Normal rate, Regular rhythm, No murmur.   Gastrointestinal: Sof, Normal bowel sounds, RLQ 4 X 4 round swelling, firm and has mild tenderness on palpation  Neurologic: Alert, Oriented, No focal deficits, Cranial Nerves II-XII are grossly intact.     Vital Signs (last 24 hrs)_____  Last Charted___________  Temp Oral     37.3 DegC  (NOV 21 07:59)  Heart Rate Peripheral  83 bpm  (NOV 21 07:59)  SBP      H 166mmHg  (NOV 21 07:59)  DBP      86 mmHg  (NOV 21 07:59)  SpO2    95 %  (NOV 21 07:59)      Results Review   Labs Last 24 Hours   No qualifying data available.      Discharge Plan   Discharge Summary Plan   Discharge disposition: discharge to home with home health care.     Prescriptions: reviewed with patient.     Course   Progressing as expected.     Education and Follow-up   Counseled: patient, regarding diagnosis, regarding treatment, regarding medications.          Elsa CHOWHDURY, acted solely as a scribe for and in the presence of Dr. Neal [place] MD who performed the service on 11/21/2018     LUCIANA [place] personally performed the services described in this documentation. The documentation was scribed in my presence and is both accurate and complete.

## 2022-04-29 NOTE — CONSULTS
DATE OF CONSULTATION:  11/28/2018    ATTENDING PHYSICIAN:  Neal [place] MD  CONSULTING PHYSICIAN:  Jacob Hernandez MD    REASON FOR CONSULTATION:  Infectious Disease evaluation and antibiotic management of a patient with Enterobacter bacteremia.    HISTORY OF PRESENT ILLNESS:  This is a 73-year-old female who is known to my service, who was recently treated for culture-negative mitral valve endocarditis with perivalvular abscess, status post surgery with valve replacement and abscess drainage as well as a coronary artery bypass graft surgery.  She did have a very difficult post operative period with hospitalization in the intensive care unit.  She was subsequently transferred to long term acute care at [facility] and completed a course of antibiotics a few weeks ago.  Apparently while she was still at the LT, she did have issues with concern for GI bleeding and was transferred to [facility], where she was admitted on 11/18/2018.  On 11/10/2018, she did have issues with positive urine culture.  She was diagnosed with urinary tract infection.  However, her urine culture at that time showed 10,000 to [id number] colonies of Enterobacter.  She was given Bactrim at the time.  On this admission, around 11/23/2018, she was noted to spike fevers.  Temperature was up to 39.4 degrees centigrade and had continued, with up to 39 degrees centigrade on 11/25/2018.  She did also have notable associated leukocytosis.  On 11/19/2018, white count was 13, and on 11/23/2018, was 12.2.  She was empirically placed on Zosyn and clinically seemed to do well on Zosyn with fevers trending down.  Patient was feeling better.  Leukocytosis resolved.  However, the final blood culture results released today did show the Enterobacter to be resistant to Zosyn, for which patient was placed on ciprofloxacin.  PICC line was also removed, which had been in place for a while.  The blood culture  on 11/22/2018, was actually 1 out of 2 sets that was positive for Enterobacter.    PAST MEDICAL HISTORY:  Significant for recent mitral valve endocarditis with perivalvular abscess, atrial fibrillation, hypertension, coronary artery disease, arthritis, irritable bowel syndrome, diabetes, CVA, coronary artery disease.    PAST SURGICAL HISTORY:  Mitral valve replacement, coronary artery bypass graft surgery, appendectomy, total hysterectomy, hernia repair, bronchoscopy, EGD, colonoscopy.    SOCIAL HISTORY:  Denies any alcohol, tobacco, or illicit drug abuse.    FAMILY HISTORY:  Reviewed.  Noncontributory.    ALLERGIES:  Iodine, vancomycin.    MEDICATIONS:  Reviewed.    REVIEW OF SYSTEMS:  Twelve-point system review done, negative other than stated above.    PHYSICAL EXAMINATION:  VITAL SIGNS:  T-max of 37.3, blood pressure 153/87, pulse is 74, respiratory rate is 18.   GENERAL APPEARANCE:  This is an elderly female who is awake, alert, oriented x4, in no acute cardiopulmonary distress.  She does not appear to be toxic at this point.  She is weak, in bed.   HEENT:  Normocephalic.  Pupils reactive.  Extraocular muscles intact.  Oropharynx:  No lesions.     NECK:  Supple.  No jugular venous distention.  No carotid bruit.     CHEST:  Clear to auscultation.  No wheezes, no crackles.  No rhonchi.   CARDIOVASCULAR: S1, S2.  Regular rate and rhythm.  No gallops.  Her midsternal surgical incision has healed quite well.   ABDOMEN:  Soft.  Bowel sounds positive.  She does have a right lower abdominal wall hematoma with no erythema, no tenderness from Lovenox injection.   :  No lesions noted.  No suprapubic tenderness.   EXTREMITIES:  Trace edema bilateral lower extremities.  No clubbing no cyanosis.   NEURO:  Cranial nerves 2-12 grossly intact.  Follows commands.  Moves her extremities.   PSYCH:  Mentation and affect are appropriate.   SKIN:  There is no rash.    LABS:  11/26/2018, Hematology:  WBC 9.7, hemoglobin 8.4,  hematocrit 27.2, platelets 185.  Chemistry, 11/20/2018:  Sodium 143, potassium 3.6, chloride 109, bicarbonate 25, glucose 83, BUN 9, creatinine 0.8.  Total bilirubin 1.2, direct bilirubin 0.4, AST 58, ALT 65, alk phos 83.    RADIOLOGY:  Left lower extremity arterial ultrasound shows no hemodynamically significant stenosis identified within the left lower extremity anterior system.  Venous ultrasound shows no sonographic evidence of DVT.    IMPRESSION:    1. Enterobacter bacteremia.  2. Prosthetic valve.   3. Gastrointestinal bleed.  4. History of recent Enterobacter bacteriuria.    5. Anemia.    RECOMMENDATIONS:  I agree with the management of this patient.  The isolated Enterobacter from the blood is similar to the one isolated from the urine about 2 weeks prior.  It does show resistant to Zosyn, however, clinically seems to have responded to coverage with Zosyn, as fevers and leukocytosis have resolved.  PICC line, likely implicated, has been removed.  I will get a blood culture today, that will give additional idea of response to treatment with Zosyn, as ciprofloxacin was just initiated today.  We will follow final results, and plan about a total of 4 weeks of antibiotics with ciprofloxacin, with plan to transition to oral antibiotics at some point if continues to do well.  Overall prognosis is guarded.  Case is discussed with nursing staff.       I would like to thank Dr. Arizmendi's team very much for the opportunity to assist in the care of this patient.        ______________________________  MD ASYA Whiting/MAURICE  DD:  11/28/2018  Time:  06:18PM  DT:  11/28/2018  Time:  10:34PM  Job #:  [id number]

## 2022-04-29 NOTE — OP NOTE
Patient:   Hafsa Jimenez            MRN: [id number]            FIN: [id number]               Age:   73 years     Sex:  Female     :  1945   Associated Diagnoses:   None   Author:   Manish Perez MD      Operative Note   Operative Information   Date/ Time:  2018 10:47:00.     Procedures Performed: EGD, diagnostic/small bowel endoscopy.     Preoperative Diagnosis: Anemia, dark stools.     Postoperative Diagnosis: 1.  Small nodule, right vocal cord (incidental finding).    2.  Hiatal hernia, small.  3.  Venous blebs versus esophageal varices, grade 1, distal esophagus (5-6 cm above GE junction).  Photo documentation was obtained.    4.  Erosive gastritis, distal body.  No evidence of ulcerations.   .     Surgeon: Manish Perez MD.     Assistant: GI staff.     Anesthesia: per anaesthesia service.     Speciman Removed: None.     Esimated blood loss: No blood loss.     Description of Procedure/Findings/    Complications: History and physical as per pre-operative note. Informed consent was obtained. Patient was placed in left lateral position. Sedation per anaesthesia service. Olympus video gastroscope was introduced into the oral cavity and esophagus was intubated under direct visuaization. The scope was carefully advanced to the distal duodenum. The patient tolerated the procedure well without any complications. .     Findings: Esophagus: GE junction at approximately 38 cm.  There was evidence of prominent venous bleb 5-6 cm above GE junction with 1 column of prominent vein.  This could also be a grade 1 esophageal varices.  Photo documentation was obtained.  There was a 2 cm hiatal hernia.  Stomach: Fundus and cardia appeared unremarkable.  There were linear erosions noted in distal body with no evidence of bleeding.  No ulcerations were noted.  Antrum appeared unremarkable.  Duodenum: Duodenal bulb, 2nd and 3rd portion of the duodenum appeared normal to the extent of exam.  At this time gastroscope was  replaced with a pediatric colonoscope.  Scope was reintroduced carefully and was carefully advanced to small bowel beyond the ligament of Treitz to proximal jejunum.  There were no AVMs, nodules or any significant ulcerations noted to the extent of exam..     Complications: None.     Recommendations:  1.  Monitor hemoglobin/hematocrit periodically.  2.  Resume anticoagulation from GI standpoint.  3.  Avoid NSAIDs.  4.  Patient is noted to have some leukocytosis today.  Would defer this to her primary service.  Repeat CBC in a.m.  5.  Obtain H. pylori antibody.  Treat if positive.  Continue PPI..

## 2022-04-29 NOTE — CONSULTS
Patient:   Hafsa Jimenez            MRN: [id number]            FIN: [id number]               Age:   73 years     Sex:  Female     :  1945   Associated Diagnoses:   None   Author:   Laurel Kendall      Consultation Information   Consultation:  RLQ hematoma from lovenox injections.       Basic Information   Source of history:  Self, Medical record.    Present at bedside      History of Present Illness   Mrs. Jimenez is a 73yoF who presented to [facility] from [facility]AC due to anemia and GI bleed. She was on coumadin due to mitral valve replacement due to endocarditis, afib with embolic CVA, and CABG  .  Coumadin was held due to GI bleed and lovenox was given.  She had recovered from her recent stay and is being discharged home, but has some concerns about a hematoma that has formed in her right lower quadrant due to the lovenox injections.  She reports that the pain was worse last night and denies any significant expansion since yesterday.          Review of Systems   Constitutional:  Negative except as documented in history of present illness.    Eye:  Negative except as documented in history of present illness.    Gastrointestinal:  No nausea, No vomiting     Abdominal pain: Right, Lower quadrant.    Integumentary:  hematoma.    Neurologic:  Alert and oriented X4.    Psychiatric:  Negative except as documented in history of present illness.       Health Status   Allergies:    Allergic Reactions (Selected)  Severity Not Documented  Iodine- No reactions were documented.  Vancomycin- Oropharngeal tightening.      Histories   Past Medical History:    Active  Dyslipidemia (4720921250)  HTN - Hypertension ([id number])  Resolved  Cataracts ([id number]):  Resolved.  Murmur ([id number]):  Resolved.  Arthritis ([id number]):  Resolved.  Irritable bowel syndrome ([id number]):  Resolved.  Borderline diabetes  ([id number]):  Resolved.  Hernia ([id number]):  Resolved.   Family History:    Diabetes mellitus type 2  Sister  Brother  Hypertension.  Sister  Mother  Glaucoma.  Father     Procedure history:    Esophagogastroduodenoscopy on 11/19/2018 at 73 Years.  Comments:  11/19/2018 10:52 - Adriana Mary RN  auto-populated from documented surgical case  Bronchoscopy, Therapeutic on 10/7/2018 at 73 Years.  Comments:  10/7/2018 10:07 - Frieda Pierson  auto-populated from documented surgical case  Bypass CABG, MVR (.) on 9/26/2018 at 73 Years.  Comments:  9/26/2018 14:06 - Anusha Baldwin RN  auto-populated from documented surgical case  Transesophageal Echo (for Surgery) (.) on 9/18/2018 at 73 Years.  Comments:  9/18/2018 14:16 - Griselda Nicholas RN  auto-populated from documented surgical case  Esophagogastroduodenoscopy on 6/14/2016 at 70 Years.  Comments:  6/14/2016 13:07 - Clayton Wilson CRNA  auto-populated from documented surgical case  Colonoscopy on 6/14/2016 at 70 Years.  Comments:  6/14/2016 13:07 - Clayton Wilson CRNA  auto-populated from documented surgical case  Appendectomy ([id number]).  Total hysterectomy ([id number]).   Social History        Social & Psychosocial Habits    Alcohol  11/04/2014  Use: Current    Type: Wine    Comment: Drinks 1 or 2 glasses of red wine socially. - 11/04/2014 18:28 - Carissa Ramirez RN    06/13/2016  Use: Past    09/15/2018  Use: Past    Employment/School  06/13/2016  Status: Employed    Nutrition/Health  06/13/2016  Type of diet: Regular    Substance Abuse  11/04/2014 Risk Assessment: Denies Substance Abuse    09/15/2018  Use: Never    Tobacco  11/04/2014 Risk Assessment: Denies Tobacco Use    06/13/2016  Use: Never smoker    11/18/2018  Use: Never smoker    Smoking Cessation Counseling No.        Physical Examination      Vital Signs (last 24 hrs)_____  Last Charted___________  Temp Oral     37.3 DegC  (NOV 21 07:59)  Heart Rate  Peripheral   83 bpm  (NOV 21 07:59)  SBP      H 166mmHg  (NOV 21 07:59)  DBP      86 mmHg  (NOV 21 07:59)  SpO2      95 %  (NOV 21 07:59)     Gen: NAD, sitting in hospital bed  Card: RRR, adequate perfusion  Pulm: non labored, symmetric expansion  Abd: soft, TTP RLQ, ND, bruising with RLQ tenderness, small area of hardness at superior portion of bruising      Health Maintenance      Review / Management   Results review:     Labs (Last four charted values)  WBC                  H 11.6 (NOV 20) H 13.0 (NOV 19) 8.8 (NOV 18)   Hgb                  L 10.1 (NOV 20) L 11.1 (NOV 19) L 10.5 (NOV 18)   Hct                  L 31.9 (NOV 20) L 36.4 (NOV 19) L 34.6 (NOV 18)   Plt                  278 (NOV 20) 353 (NOV 19) 371 (NOV 18)   Na                   143 (NOV 20) 143 (NOV 19) 142 (NOV 18)   K                    3.6 (NOV 20) 3.5 (NOV 19) 3.9 (NOV 18)   CO2                  25.0 (NOV 20) 24.0 (NOV 19) 25.0 (NOV 18)   Cl                   H 109 (NOV 20) H 109 (NOV 19) H 110 (NOV 18)   Cr                   0.82 (NOV 20) 0.77 (NOV 19) 0.84 (NOV 18)   BUN                  9.0 (NOV 20) 7.0 (NOV 19) 9.0 (NOV 18)   Glucose Random       83 (NOV 20) H 127 (NOV 19) 106 (NOV 18)   PT                   H 15.9 (NOV 19)   INR                  1.2 (NOV 19)   PTT                  31.2 (NOV 19) .       Impression and Plan   A/P 73yoF with RLQ hematoma    - warm compresses to area  - discussed signs of local and systemic infection and to report to ER if she should notice any  - appreciate consultation    Laurel Yarbrough PA-C

## 2022-04-29 NOTE — DISCHARGE SUMMARY
Patient:   Hafsa Jimenez            MRN: [id number]            FIN: [id number]               Age:   73 years     Sex:  Female     :  1945   Associated Diagnoses:   None   Author:   Neal Hampton MD      Discharge Information      Discharge Summary Information   Admit/Discharge Dates   Admit Date: 2018  Discharge Date: 2018   Physicians   Attending Physician - Memo WEBER, Neal  Admitting Physician - Kamaljit WEBER, Hortensia  Consulting Physician - Chris WEBER, Manish  Primary Care Physician - Carlos Flores MD        Discharge Diagnosis   ? 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   Discharge Medications   Prescribed  labetalol (labetalol 5 mg/mL intravenous solution) 10 mg, IV Push, q4hr, PRN hypertension  zinc oxide topical (David Butt Paste) 1 dionte, TOP, q2hr, PRN rash  Continue  Dr Lowery's Crack Cream  acetaminophen (Tylenol 325 mg oral tablet) 650 mg, Oral, q4hr, PRN pain, mild  alPRAzolam (Xanax 0.25 mg oral tablet) 0.25 mg, Oral, TID, PRN as needed for anxiety  aldioxa-chloroxylenol topical (ZeaSORB) 1 application, TOP  amiodarone (amiodarone 200 mg oral Tab) 200 mg, Oral, Daily  aspirin (aspirin 81 mg oral tablet, CHEWABLE) 81 mg, Oral, Daily  atorvastatin (atorvastatin 40 mg oral tablet) 40 mg, Oral, Daily  calcium-vitamin D (Calcium 600+D) 2 tab(s), Oral, Daily  citalopram (Celexa 20 mg oral tablet) 20 mg, Oral, Daily  docusate (docusate sodium 50 mg oral capsule) 50 mg, Oral, Daily, PRN as needed for constipation  enoxaparin (Lovenox) 80 mg, Subcutaneous, BID  furosemide (Lasix 20 mg oral tablet) 20 mg, Oral,  Daily  metoprolol (metoprolol tartrate 50 mg oral tab) 100 mg, Oral, BID  niacin (Niacin  mg oral capsule, extended release) 500 mg, Oral, Once a day (at bedtime)  omega-3 polyunsaturated fatty acids (Fish Oil oral capsule) 1 cap(s), Oral, BID  ondansetron (Zofran ODT 4 mg oral tablet, disintegrating) 4 mg, Oral, q8hr, PRN nausea/vomiting  ondansetron (Zofran ODT 4 mg oral tablet, disintegrating) 4 mg, Oral, q8hr, PRN as needed for nausea/vomiting  oxyCODONE (oxycodone 5 mg oral tablet) 5 mg, Oral, q4hr, PRN pain, moderate  oxyCODONE (oxycodone 5 mg oral tablet) 10 mg, Oral, q6hr, PRN pain, severe  pantoprazole (Pantoprazole 40 mg ORAL EC-Tablet) 40 mg, Oral, Daily  promethazine (Phenergan IV Additive) 6.25, IV, q4hr, PRN as needed for nausea/vomiting  ramipril (ramipril 5 mg oral capsule) 5 mg, Oral, Daily  Discontinue  cefTRIAXone 2 gm, IV Piggyback, q24hr  diltiazem (DILTIAZEM 24HR  MG CAP) 240 mg, Oral, Daily  diltiazem (DILTIAZEM 24HR  MG CAP)  diphenhydrAMINE (Benadryl (for IV Push)) 12.5 mg, IV, q6hr, PRN itching  docusate (docusate 10 mg/mL oral liquid) 50 mg, Oral, Daily  docusate (docusate sodium 10 mg/mL oral liquid) 50 mg, Oral, Daily, PRN other (see comment)  fexofenadine (Allegra) 180 mg, Oral, Daily  hydrALAZINE (hydrALAZINE (Apresoline) Inj.) 20 mg, IV, q2hr, PRN hypertension  levoFLOXacin (Levaquin 500 mg oral tablet) 500 mg, Oral, Daily  linezolid (Zyvox) 600 mg, IV, BID  metoprolol (metoprolol tartrate 1 mg/mL injectable sol) 5 mg, IV, q6hr, PRN tachycardia  metoprolol (metoprolol tartrate 25 mg oral tab) 25 mg, Oral, BID  ondansetron (Zofran 2 mg/mL injectable solution) 8 mg, IV, q4hr  promethazine (Phenergan IV Additive) 25 mg, IV, q4hr, PRN as needed for nausea/vomiting  rosuvastatin (Crestor 10 mg oral tablet) 10 mg, Oral, Once a day (at bedtime)  sotalol (sotalol 80 mg oral tablet) 80 mg, Oral, BID  warfarin (Coumadin 5 mg oral tablet) 5 mg, Oral, Daily      Education    Discharge - 11/20/18 7:42:00 CST, DC/Trans to LT[facility], Give all scheduled vaccinations prior to discharge.   Discharge Activity - Activity as Tolerated   Discharge Diet - Low Sodium       Hospital Course   Patient is a 73 year old woman who was transferred from [facility] for anemia and black stools . Patient was recently hospitalized in September and October of this year initially for an embolic stroke and non-ST elevation MI. Cardiology was following, patient got CRISPIN done and that showed mitral valve vegetation. Patient underwent prosthetic mitral valve replacement with drainage of abscess and double CABG on 9/26/18.She was doing well postop but then went into a flutter with RVR was started on beta blockers and calcium channel blockers transferred to ICU. She was intubated because of pulmonary edema also had some pleural effusion for which she had thoracentesis and was finally extubated on October 9, 2018. ID was consulted patient was on Merrem and Zyvox and they recommended 6 weeks of IV antibiotics and for that patient was transferred to [facility]. Patient was also started on Coumadin because of her embolic stroke and history of A. fib but she started having some black stools occult blood was ordered that was positive ×1. Cardiology was consulted who recommended continuing anticoagulation as patient is at pretty high risk for stroke. GI was consulted to who recommended transfer to the main campus so she was transferred to [facility]. Dr. Davis is her gastroenterologist and was consulted. She was then discharged to LTAC on 6 weeks of IV antibiotics which was completed in Nov. Was tolerating PT well and had not had any recurrences of afib. She has been on Lovenox 80 mg subq. Coumadin has been held since noted GI bleed. She also had some complaints of nausea for the past two weeks. Denies any fever, chills or abdominal pain. Patient was admitted to hospitalist services with plans  for endoscopy in the AM with Dr. Davis. The patient's Hb stablized, and her stools were brown in color without any blood. She complained of nausea that began 11/19/18, and she was given Zofran which resulted in resolvement of nausea. The EGD evidenced a small hiatal hernia, a small nodule in the right vocal cord, venous blebs versus esophageal varices, and erosive gastritis. The patient remained with a stable H&H, and no new complaints.   On the day of discharge the patient was no longer nauseous, and had no acute events overnight. She was asymptomatic, denied abdominal pain, and was tolerating PO liquids. Will advance diet to soft diet for a few days, and she can then return to regular diet. Cleared by GI team.  Pt will be discharged back to LTAC today in stable condition. All other comorbidities and conditions remained stable throughout hospital stay. Coumadin can be restarted.   Discharge Duration = 31 minutes      Physical Examination   General: Alert and oriented, No acute distress,  Respiratory: Lungs are clear to auscultation, Breath sounds are equal.   Cardiovascular: Normal rate, Regular rhythm, No murmur.   Gastrointestinal: Soft, Non-tender, Non-distended, Normal bowel sounds  Neurologic: Alert, Oriented, No focal deficits, Cranial Nerves II-XII are grossly intact.     Vital Signs (last 24 hrs)_____  Last Charted___________  Temp Oral     36.8 DegC  (NOV 20 07:06)  Heart Rate Peripheral  82 bpm  (NOV 20 07:06)  Resp Rate         16 br/min  (NOV 20 03:37)  SBP      H 144mmHg  (NOV 20 07:06)  DBP      76 mmHg  (NOV 20 07:06)  SpO2    95 %  (NOV 20 07:06)      Results Review   Labs Last 24 Hours   Chemistry  Hematology/Coagulation    Sodium Lvl: 143 mmol/L (11/20/18 05:58:00) WBC: 11.6 x10(3)/mcL High (11/20/18 05:58:00)   Potassium Lvl: 3.6 mmol/L (11/20/18 05:58:00) RBC: 3.34 x10(6)/mcL Low (11/20/18 05:58:00)   Chloride: 109 mmol/L High (11/20/18 05:58:00) Hgb: 10.1 gm/dL Low (11/20/18 05:58:00)   CO2: 25  mmol/L (11/20/18 05:58:00) Hct: 31.9 % Low (11/20/18 05:58:00)   Calcium Lvl: 8.6 mg/dL (11/20/18 05:58:00) Platelet: 278 x10(3)/mcL (11/20/18 05:58:00)   Glucose Lvl: 83 mg/dL (11/20/18 05:58:00) MCV: 95.5 fL High (11/20/18 05:58:00)   BUN: 9 mg/dL (11/20/18 05:58:00) MCH: 30.2 pg (11/20/18 05:58:00)   Creatinine: 0.82 mg/dL (11/20/18 05:58:00) MCHC: 31.7 gm/dL Low (11/20/18 05:58:00)   eGFR-AA: >60 (11/20/18 05:58:00) RDW: 19.7 % High (11/20/18 05:58:00)   eGFR-CRISTINA: >60 (11/20/18 05:58:00) MPV: 10.7 fL (11/20/18 05:58:00)   Bili Total: 1.2 mg/dL High (11/20/18 05:58:00) Abs Neut: 9.24 x10(3)/mcL High (11/20/18 05:58:00)   Bili Direct: 0.4 mg/dL High (11/20/18 05:58:00) Neutro Auto: 80 % (11/20/18 05:58:00)   Bili Indirect: 0.8 mg/dL (11/20/18 05:58:00) Lymph Auto: 9 % (11/20/18 05:58:00)   AST: 58 unit/L High (11/20/18 05:58:00) Mono Auto: 9 % (11/20/18 05:58:00)   ALT: 65 unit/L (11/20/18 05:58:00) Eos Auto: 1 % (11/20/18 05:58:00)   Alk Phos: 83 unit/L (11/20/18 05:58:00) Abs Eos: 0.1 x10(3)/mcL (11/20/18 05:58:00)   Total Protein: 5.4 gm/dL Low (11/20/18 05:58:00) Basophil Auto: 0 % (11/20/18 05:58:00)   Albumin Lvl: 2.8 gm/dL Low (11/20/18 05:58:00) Abs Neutro: 9.24 x10(3)/mcL High (11/20/18 05:58:00)   Globulin: 2.6 gm/dL (11/20/18 05:58:00) Abs Lymph: 1.1 x10(3)/mcL (11/20/18 05:58:00)   A/G Ratio: 1.1 (11/20/18 05:58:00) Abs Mono: 1 x10(3)/mcL (11/20/18 05:58:00)    Abs Baso: 0 x10(3)/mcL (11/20/18 05:58:00)         Discharge Plan   Discharge Summary Plan   Discharge disposition: discharge to skilled nursing facility LTAC.     Prescriptions: reviewed with patient.     Course   Progressing as expected.     Education and Follow-up   Counseled: patient, regarding diagnosis, regarding treatment, regarding medications.       Elsa CHOWDHURY, acted solely as a scribe for and in the presence of Dr. Neal Hampton MD who performed the service on 11/20/2018     I Dr AMY Hampton personally performed the  services described in this documentation. The documentation was scribed in my presence and is both accurate and complete.

## 2022-05-03 NOTE — HISTORICAL [facility] CERNER
This is a historical note converted from [place]. Formatting and pictures may have been removed.  Please reference Audrey for original formatting and attached multimedia. Chief Complaint  Black stools  History of Present Illness  Patient is a 73 year old woman who was transferred from LTAC for anemia and black stools?. Patient was recently?hospitalized in September and October of this year?initially?for an embolic stroke?and non-ST elevation MI. ?Cardiology was following?patient got CRISPIN done?and that showed?mitral valve vegetation.? Patient?underwent prosthetic mitral valve replacement with drainage of abscess and double CABG on 9/26/18.She was doing well postop but then went into a flutter with RVR was started on beta blockers and calcium channel blockers transferred to ICU. ?She was intubated because of pulmonary edema also had some pleural effusion for which she had thoracentesis and was finally extubated on October 9, 2018. ?ID was consulted patient was on Merrem and Zyvox and they recommended 6 weeks of IV antibiotics and for that patient was transferred to [facility]. ?Patient was also started on Coumadin because of her embolic stroke and history of A. fib but she started having some black stools occult blood was ordered that was positive??1. ?Cardiology was consulted who recommended continuing anticoagulation as patient is at pretty high risk for stroke. ?GI was consulted to who recommended transfer to the main [place] so she was transferred to [facility]. ?Dr. Davis ?is her gastroenterologist and has ?been consulted  She was then discharged to LTAC on 6 weeks of IV antibiotics which was completed on in nov. Has been tolerating PT well and had not had any recurrences of afib. She has been on Lovenox 80 mg subq. Coumadin has been held since noted GI bleed. She also has some complaints of nausea for the past two weeks. Denies any fever, chills or abdominal pain. Patient has been  admitted to hospitalist services with plans for?endoscopy in the AM with Dr. Davis.  Review of Systems  Except as documented, all other systems reviewed and negative  Physical Exam  Vitals & Measurements  T:?36.4? ?C (Oral)? HR:?79(Peripheral)? BP:?154/81? SpO2:?97%? WT:?60.7?kg?  General: No acute distress, Awake, Alert, Oriented x3  HEENT: PERRL, EOMI, no scleral icterus, OP clear  Respiratory: CTA bilateral, no wheezes, rales, or rhonchi  Cardiovascular: Regular rate and rhythm, no murmurs, rubs or gallops, +s1/s2  Gastrointestinal: soft, nontender, nondistended, +BS  Musculoskeletal: Normal range of motion, no pertinent deformity  Integumentary: clean, warm, dry, intact  Neurologic:?Alert awake oriented  Assessment/Plan  GI Bleed  GI has been consulted, plans for?endoscopy in the AM  Nothing by mouth after midnight  Check CBC now, will recheck in the AM  IV Protonix and IVF  ?   Nausea  Zofran as needed and IV fluids  ?   Atrial Fibrillation/Flutter  Maintaining NSR. ?Will resume?amiodarone and metoprolol  Rate is controlled  Coumadin on hold,?will continue with Lovenox?and maybe we can hold?in the morning if she is going for surgery  ?   Acute Blood Loss Anemia  Will recheck H&H now and in the AM  ?   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  Blood cultures x2 collected  ?   Essential Hypertension  Home medications have been resumed  ?   CAD s/p CABG in 09/2018  ?   History of?embolic CVA  Hold Coumadin for now?but on full dose of Lovenox  ?  UTI?with Enterobacter cloacae  -Was on abx that was started on?November 11, 2018  -We will continue antibiotic?for 1 more day, will give Bactrim?as per the sensitivities  ?  ?  ?  ?  Condition: Fair  ?  Prophylaxis: Lovenox?and Protonix  ?  All diagnosis and differential  diagnosis have been reviewed; assessment and plan has been documented; I have personally reviewed the labs and test results that are presently available; I have reviewed the patients medication list; I have reviewed the consulting providers response and recommendations. I have reviewed or attempted to review medical records based upon their availability.  ?  I, Janae Olivier, acted solely as a scribe for and in the presence of Dr. Hortensia Mari MD who performed the service  ?  ?  ?  ?  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  Dyslipidemia  HTN - Hypertension  Historical  Arthritis  Borderline diabetes  Cataracts  Irritable bowel syndrome  Murmur  CVA  CAD  Procedure/Surgical History  Bronchoscopy, Therapeutic (10/07/2018)  Bypass CABG, MVR (.) (09/26/2018)  Colonoscopy (06/14/2016)  Esophagogastroduodenoscopy (06/14/2016)  Appendectomy  Total hysterectomy   Hernia repair  Medications  Home  Allegra, 180 mg, Oral, Daily  aspirin 81 mg oral tablet, CHEWABLE, 81 mg= 1 tab(s), Oral, Daily  Calcium 600+D, 2 tab(s), Oral, Daily  cefTRIAXone, 2 gm= 50 mL, IV Piggyback, q24hr  Coumadin 5 mg oral tablet, 5 mg= 1 tab(s), Oral, Daily  Crestor 10 mg oral tablet, 10 mg= 1 tab(s), Oral, Once a day (at bedtime)  docusate sodium 50 mg oral capsule, 50 mg= 1 cap(s), Oral, Daily, PRN  Fish Oil oral capsule, 1 cap(s), Oral, BID  Lasix 20 mg oral tablet, 20 mg= 1 tab(s), Oral, Daily  Lovenox, 80 mg= 0.8 mL, Subcutaneous, BID  metoprolol tartrate 25 mg oral tab, 25 mg= 1 tab(s), Oral, BID  Niacin  mg oral capsule, extended release, 500 mg= 1 cap(s), Oral, Once a day (at bedtime)  Pantoprazole 40 mg ORAL EC-Tablet, 40 mg= 1 tab(s), Oral, Daily  ramipril 5 mg oral capsule, 5 mg= 1 cap(s), Oral,  Daily  sotalol 80 mg oral tablet, 80 mg= 1 tab(s), Oral, BID  Zofran ODT 4 mg oral tablet, disintegrating, 4 mg= 1 tab(s), Oral, q8hr, PRN  Zyvox, 600 mg= 300 mL, IV, BID  Inpatient  Medications (4) Active  Scheduled: (1)  pantoprazole 40 mg Inj ?40 mg 1 EA, IV Slow, BID  Continuous: (1)  sodium chloride 0.9% 1,000 mL ?1,000 mL, IV, 60 mL/hr  PRN: (2)  ondansetron 2 mg/mL inj - 2mL ?4 mg 2 mL, IV Push, q4hr  zinc oxide top 20% Oint (per oz) ?1 dionte, TOP, q2hr  ?  Allergies  iodine  vancomycin?(oropharngeal tightening)  Social History  EtOH: Denies  Tobacco: Denies  Substance: Denies  Family History  Diabetes mellitus type 2: Sister and Brother.  Glaucoma.: Father.  Hypertension.: Mother and Sister.  Immunizations  Vaccine Date Status Comments   influenza virus vaccine, inactivated 09/16/2018 Given Early/Late Reason:  Nursing Judgment  Other : ?correcting site of administration   Lab Results  Labs are pending

## 2022-05-04 ENCOUNTER — ANTI-COAG VISIT (OUTPATIENT)
Dept: CARDIOLOGY | Facility: HOSPITAL | Age: 77
End: 2022-05-04
Payer: MEDICARE

## 2022-05-04 DIAGNOSIS — Z95.2 H/O MITRAL VALVE REPLACEMENT WITH MECHANICAL VALVE: Primary | ICD-10-CM

## 2022-05-04 DIAGNOSIS — I63.9 CEREBROVASCULAR ACCIDENT (CVA), UNSPECIFIED MECHANISM: ICD-10-CM

## 2022-05-04 LAB
CTP QC/QA: YES
INR PPP: 3.5 (ref 2–3)
PROTHROMBIN TIME, POC: 42 (ref 2–3)

## 2022-05-04 PROCEDURE — 99211 OFF/OP EST MAY X REQ PHY/QHP: CPT

## 2022-05-04 PROCEDURE — 85610 PROTHROMBIN TIME: CPT

## 2022-06-01 ENCOUNTER — ANTI-COAG VISIT (OUTPATIENT)
Dept: CARDIOLOGY | Facility: HOSPITAL | Age: 77
End: 2022-06-01
Payer: MEDICARE

## 2022-06-01 DIAGNOSIS — Z95.2 H/O MITRAL VALVE REPLACEMENT WITH MECHANICAL VALVE: ICD-10-CM

## 2022-06-01 DIAGNOSIS — I63.9 CEREBROVASCULAR ACCIDENT (CVA), UNSPECIFIED MECHANISM: Primary | ICD-10-CM

## 2022-06-01 LAB
CTP QC/QA: YES
INR PPP: 4.2 (ref 2–3)
PROTHROMBIN TIME, POC: 51 (ref 2–3)

## 2022-06-01 PROCEDURE — 85610 PROTHROMBIN TIME: CPT

## 2022-06-01 PROCEDURE — 99211 OFF/OP EST MAY X REQ PHY/QHP: CPT

## 2022-06-15 ENCOUNTER — ANTI-COAG VISIT (OUTPATIENT)
Dept: CARDIOLOGY | Facility: HOSPITAL | Age: 77
End: 2022-06-15
Payer: COMMERCIAL

## 2022-06-15 DIAGNOSIS — Z95.2 H/O MITRAL VALVE REPLACEMENT WITH MECHANICAL VALVE: Primary | ICD-10-CM

## 2022-06-15 DIAGNOSIS — I63.9 CEREBROVASCULAR ACCIDENT (CVA), UNSPECIFIED MECHANISM: ICD-10-CM

## 2022-06-15 LAB
CTP QC/QA: YES
INR PPP: 3.3 (ref 2–3)
PROTHROMBIN TIME, POC: 39.5 (ref 2–3)

## 2022-06-15 PROCEDURE — 85610 PROTHROMBIN TIME: CPT

## 2022-07-13 ENCOUNTER — ANTI-COAG VISIT (OUTPATIENT)
Dept: CARDIOLOGY | Facility: HOSPITAL | Age: 77
End: 2022-07-13
Payer: MEDICARE

## 2022-07-13 DIAGNOSIS — Z95.2 H/O MITRAL VALVE REPLACEMENT WITH MECHANICAL VALVE: Primary | ICD-10-CM

## 2022-07-13 DIAGNOSIS — I63.9 CEREBROVASCULAR ACCIDENT (CVA), UNSPECIFIED MECHANISM: ICD-10-CM

## 2022-07-13 LAB
CTP QC/QA: YES
INR PPP: 4.1 (ref 2–3)
PROTHROMBIN TIME, POC: 48.8 (ref 2–3)

## 2022-07-13 PROCEDURE — 85610 PROTHROMBIN TIME: CPT

## 2022-07-13 PROCEDURE — 99211 OFF/OP EST MAY X REQ PHY/QHP: CPT

## 2022-07-13 NOTE — LETTER
July 13, 2022        Carlos Flores MD  [address]  [facility] [address]             [facility]  [address] [facility] [address]  Phone: [phone]  Fax: [phone]   Patient: Hafsa Jimenez   MR Number: [id number]   YOB: 1945   Date of Visit: 7/13/2022       Dear Dr. Flores:      Thank you for allowing me to care for  Hafsa Jimenez. Attached you will find pt/inr results and dosing.     If you have questions, please do not hesitate to call me. I look forward to following Hafsa Jimenez along with you.    Sincerely,      Shireen Mina RN            CC  No Recipients    Enclosure

## 2022-07-27 ENCOUNTER — ANTI-COAG VISIT (OUTPATIENT)
Dept: CARDIOLOGY | Facility: HOSPITAL | Age: 77
End: 2022-07-27
Payer: MEDICARE

## 2022-07-27 DIAGNOSIS — I63.9 CEREBROVASCULAR ACCIDENT (CVA), UNSPECIFIED MECHANISM: ICD-10-CM

## 2022-07-27 DIAGNOSIS — Z95.2 H/O MITRAL VALVE REPLACEMENT WITH MECHANICAL VALVE: Primary | ICD-10-CM

## 2022-07-27 DIAGNOSIS — Z79.01 LONG TERM (CURRENT) USE OF ANTICOAGULANTS: ICD-10-CM

## 2022-07-27 LAB
CTP QC/QA: YES
INR PPP: 2.7 (ref 2–3)
PROTHROMBIN TIME, POC: 32.6 (ref 2–3)

## 2022-07-27 PROCEDURE — 85610 PROTHROMBIN TIME: CPT

## 2022-07-27 NOTE — LETTER
July 27, 2022        Carlos Flores MD  [address]  [facility] [address]             [facility]  [address] [facility] [address]  Phone: [phone]  Fax: [phone]   Patient: Hafsa Jimenez   MR Number: [id number]   YOB: 1945   Date of Visit: 7/27/2022       Dear Dr. Flores:      Thank you for allowing me to care for  Hafsa Jimenez. Attached you will find pt/inr results and dosing.         If you have questions, please do not hesitate to call me. I look forward to following Hafsa Jimenez along with you.    Sincerely,      Shireen Mina RN            CC  No Recipients    Enclosure

## 2022-08-17 ENCOUNTER — ANTI-COAG VISIT (OUTPATIENT)
Dept: CARDIOLOGY | Facility: HOSPITAL | Age: 77
End: 2022-08-17
Payer: MEDICARE

## 2022-08-17 DIAGNOSIS — Z79.01 LONG TERM (CURRENT) USE OF ANTICOAGULANTS: ICD-10-CM

## 2022-08-17 DIAGNOSIS — Z95.2 H/O MITRAL VALVE REPLACEMENT WITH MECHANICAL VALVE: Primary | ICD-10-CM

## 2022-08-17 DIAGNOSIS — I63.9 CEREBROVASCULAR ACCIDENT (CVA), UNSPECIFIED MECHANISM: ICD-10-CM

## 2022-08-17 LAB
CTP QC/QA: YES
INR PPP: 2.2 (ref 2–3)
PROTHROMBIN TIME, POC: 27 (ref 2–3)

## 2022-08-17 PROCEDURE — 85610 PROTHROMBIN TIME: CPT

## 2022-08-17 PROCEDURE — 99211 OFF/OP EST MAY X REQ PHY/QHP: CPT

## 2022-08-25 ENCOUNTER — ANTI-COAG VISIT (OUTPATIENT)
Dept: CARDIOLOGY | Facility: HOSPITAL | Age: 77
End: 2022-08-25
Payer: MEDICARE

## 2022-08-25 DIAGNOSIS — Z95.2 H/O MITRAL VALVE REPLACEMENT WITH MECHANICAL VALVE: Primary | ICD-10-CM

## 2022-08-25 DIAGNOSIS — Z79.01 LONG TERM (CURRENT) USE OF ANTICOAGULANTS: ICD-10-CM

## 2022-08-25 DIAGNOSIS — I63.9 CEREBROVASCULAR ACCIDENT (CVA), UNSPECIFIED MECHANISM: ICD-10-CM

## 2022-08-25 LAB
CTP QC/QA: YES
INR PPP: 1.8 (ref 2–3)
PROTHROMBIN TIME, POC: 21.4 (ref 2–3)

## 2022-08-25 PROCEDURE — 85610 PROTHROMBIN TIME: CPT

## 2022-08-25 NOTE — LETTER
August 25, 2022        Carlos Flores MD  [address]  [facility] [address]             [facility]  [address] [address]  Phone: [phone]  Fax: [phone]   Patient: Hafsa Jimenez   MR Number: [id number]   YOB: 1945   Date of Visit: 8/25/2022       Dear Dr. Flores:      Thank you for allowing me to care for  Hafsa Jimenez. Attached you will find pt/inr results and dosing.       If you have questions, please do not hesitate to call me. I look forward to following Hafsa Jimenez along with you.    Sincerely,      Shireen Mina RN            CC  No Recipients    Enclosure          Patient without significant past psychiatric history has been exhibiting waxing and waning mental status, disorientation and paranoid delusions since being extubated on 5/9 consistent with delirium.   - Decrease scheduled Risperdal M tabs to 1 mg qAM and 2 mg qHS. Will continue to titrate as needed.  - Continue Zyprexa 5 mg PO/IM q8 PRN nonredirectable agitation. QTc on 5/16 467. Continue checking perioidic EKG to monitor QTc. Continue to monitor CBC for any worsening leukopenia/neutropenia with antipsychotic medications.  - Patient no longer gravely disabled, may rescind CEC; patient has benefited from presence of sitter to reorient and prevent from pulling out IVs/touching lines, pumps and would continue to benefit from a sitter if available. Upon discharge from hospital, patient may schedule therapy and psychiatry follow up at [facility] ([address] [place] [address]; Phone: [phone]; accepts Medicaid)  · DELIRIUM BEHAVIOR MANAGEMENT  · PLEASE utilize CHEMICAL restraints with PRN meds first for agitation. Minimize use of PHYSICAL restraints  · Keep window shades open and room lit during day and room dim at night in order to promote normal sleep-wake cycles  · Encourage family at bedside. [place] patient often to situation, location, date.  · Continue to Limit or Discontinue use of Narcotics, Benzos and Anti-cholinergic (Avoid Benadryl) medications as they may worsen delirium.   · Continue medical workup for causative etiology of Delirium. CT head with and without contrast without acute intracranial processes. Recommend repeat CXR to rule out additional infectious process, check amylase, lipase, B12, folate, HIV, RPR.

## 2022-08-31 ENCOUNTER — ANTI-COAG VISIT (OUTPATIENT)
Dept: CARDIOLOGY | Facility: HOSPITAL | Age: 77
End: 2022-08-31
Payer: MEDICARE

## 2022-08-31 DIAGNOSIS — Z95.2 H/O MITRAL VALVE REPLACEMENT WITH MECHANICAL VALVE: Primary | ICD-10-CM

## 2022-08-31 DIAGNOSIS — I63.9 CEREBROVASCULAR ACCIDENT (CVA), UNSPECIFIED MECHANISM: ICD-10-CM

## 2022-08-31 DIAGNOSIS — Z79.01 LONG TERM (CURRENT) USE OF ANTICOAGULANTS: ICD-10-CM

## 2022-08-31 LAB
CTP QC/QA: YES
INR PPP: 1.7 (ref 2–3)
PROTHROMBIN TIME, POC: 20.4 (ref 2–3)

## 2022-08-31 PROCEDURE — 85610 PROTHROMBIN TIME: CPT

## 2022-09-15 ENCOUNTER — ANTI-COAG VISIT (OUTPATIENT)
Dept: CARDIOLOGY | Facility: HOSPITAL | Age: 77
End: 2022-09-15
Payer: MEDICARE

## 2022-09-15 DIAGNOSIS — Z79.01 LONG TERM (CURRENT) USE OF ANTICOAGULANTS: ICD-10-CM

## 2022-09-15 DIAGNOSIS — I63.9 CEREBROVASCULAR ACCIDENT (CVA), UNSPECIFIED MECHANISM: ICD-10-CM

## 2022-09-15 DIAGNOSIS — Z95.2 H/O MITRAL VALVE REPLACEMENT WITH MECHANICAL VALVE: Primary | ICD-10-CM

## 2022-09-15 LAB
CTP QC/QA: YES
INR PPP: 4 (ref 2–3)
PROTHROMBIN TIME, POC: 48 (ref 2–3)

## 2022-09-15 PROCEDURE — 85610 PROTHROMBIN TIME: CPT

## 2022-09-15 PROCEDURE — 99211 OFF/OP EST MAY X REQ PHY/QHP: CPT

## 2022-09-15 NOTE — LETTER
September 15, 2022        Carlos Flores MD  [address]  [facility] [address]             [facility]  [address] [address]  Phone: [phone]  Fax: [phone]   Patient: Hafsa Jimenez   MR Number: [id number]   YOB: 1945   Date of Visit: 9/15/2022       Dear Dr. Flores:      Thank you for allowing me to care for  Hafsa Jimenez. Attached you will find pt/inr results and dosing.     If you have questions, please do not hesitate to call me. I look forward to following Hafsa Jimenez along with you.    Sincerely,      Shireen Mina RN            CC  No Recipients    Enclosure

## 2022-09-26 RX ORDER — FOLIC ACID 1 MG/1
1 TABLET ORAL DAILY
COMMUNITY

## 2022-09-26 RX ORDER — FUROSEMIDE 20 MG/1
20 TABLET ORAL DAILY
COMMUNITY

## 2022-09-26 RX ORDER — POLYETHYLENE GLYCOL 3350, SODIUM SULFATE ANHYDROUS, SODIUM BICARBONATE, SODIUM CHLORIDE, POTASSIUM CHLORIDE 236; 22.74; 6.74; 5.86; 2.97 G/4L; G/4L; G/4L; G/4L; G/4L
240 POWDER, FOR SOLUTION ORAL ONCE
COMMUNITY

## 2022-09-26 RX ORDER — CITALOPRAM 20 MG/1
20 TABLET, FILM COATED ORAL DAILY
COMMUNITY

## 2022-09-26 RX ORDER — NITROGLYCERIN 0.4 MG/1
0.4 TABLET SUBLINGUAL
COMMUNITY

## 2022-09-26 RX ORDER — ATORVASTATIN CALCIUM 80 MG/1
80 TABLET, FILM COATED ORAL DAILY
COMMUNITY

## 2022-09-26 RX ORDER — WARFARIN 3 MG/1
3 TABLET ORAL DAILY
COMMUNITY

## 2022-09-26 RX ORDER — GLUCOSAMINE/CHONDR SU A SOD 167-133 MG
100 CAPSULE ORAL NIGHTLY
COMMUNITY

## 2022-09-26 RX ORDER — ACETAMINOPHEN 325 MG/1
325 TABLET ORAL
COMMUNITY

## 2022-09-26 RX ORDER — RAMIPRIL 5 MG/1
5 CAPSULE ORAL DAILY
COMMUNITY

## 2022-09-26 RX ORDER — ONDANSETRON 4 MG/1
4 TABLET, FILM COATED ORAL
COMMUNITY

## 2022-09-26 RX ORDER — METOPROLOL TARTRATE 50 MG/1
50 TABLET ORAL 2 TIMES DAILY
COMMUNITY

## 2022-09-26 RX ORDER — AMOXICILLIN 500 MG
CAPSULE ORAL 2 TIMES DAILY
COMMUNITY

## 2022-09-26 RX ORDER — PANTOPRAZOLE SODIUM 40 MG/1
40 TABLET, DELAYED RELEASE ORAL DAILY
COMMUNITY

## 2022-09-28 ENCOUNTER — ANTI-COAG VISIT (OUTPATIENT)
Dept: CARDIOLOGY | Facility: HOSPITAL | Age: 77
End: 2022-09-28
Payer: MEDICARE

## 2022-09-28 DIAGNOSIS — Z79.01 LONG TERM (CURRENT) USE OF ANTICOAGULANTS: ICD-10-CM

## 2022-09-28 DIAGNOSIS — I63.9 CEREBROVASCULAR ACCIDENT (CVA), UNSPECIFIED MECHANISM: ICD-10-CM

## 2022-09-28 DIAGNOSIS — Z95.2 H/O MITRAL VALVE REPLACEMENT WITH MECHANICAL VALVE: Primary | ICD-10-CM

## 2022-09-28 LAB
CTP QC/QA: YES
INR PPP: 3.7 (ref 2–3)
PROTHROMBIN TIME, POC: 44.9 (ref 2–3)

## 2022-09-28 PROCEDURE — 85610 PROTHROMBIN TIME: CPT

## 2022-09-28 NOTE — LETTER
September 28, 2022        Carlos Flores MD  [address]  [facility] [address]             [facility]  [address] [address]  Phone: [phone]  Fax: [phone]   Patient: Hafsa Jimenez   MR Number: [id number]   YOB: 1945   Date of Visit: 9/28/2022       Dear Dr. Flores:    Thank you for allowing me to care for  Hafsa Jimenez. Attached you will find pt/inr results and dosing.       If you have questions, please do not hesitate to call me. I look forward to following Hafsa Jimenez along with you.    Sincerely,      Shireen Mina RN            CC  No Recipients    Enclosure

## 2022-10-06 ENCOUNTER — ANTI-COAG VISIT (OUTPATIENT)
Dept: CARDIOLOGY | Facility: HOSPITAL | Age: 77
End: 2022-10-06
Payer: MEDICARE

## 2022-10-06 ENCOUNTER — LAB VISIT (OUTPATIENT)
Dept: LAB | Facility: HOSPITAL | Age: 77
End: 2022-10-06
Attending: INTERNAL MEDICINE
Payer: MEDICARE

## 2022-10-06 DIAGNOSIS — R10.9 ABDOMINAL PAIN, UNSPECIFIED ABDOMINAL LOCATION: Primary | ICD-10-CM

## 2022-10-06 DIAGNOSIS — R14.0 ABDOMINAL DISTENSION: ICD-10-CM

## 2022-10-06 DIAGNOSIS — Z95.2 H/O MITRAL VALVE REPLACEMENT WITH MECHANICAL VALVE: Primary | ICD-10-CM

## 2022-10-06 DIAGNOSIS — Z79.01 LONG TERM (CURRENT) USE OF ANTICOAGULANTS: ICD-10-CM

## 2022-10-06 DIAGNOSIS — K21.9 GERD WITHOUT ESOPHAGITIS: ICD-10-CM

## 2022-10-06 DIAGNOSIS — K62.5 HEMORRHAGE OF RECTUM AND ANUS: ICD-10-CM

## 2022-10-06 DIAGNOSIS — I63.9 CEREBROVASCULAR ACCIDENT (CVA), UNSPECIFIED MECHANISM: ICD-10-CM

## 2022-10-06 DIAGNOSIS — R11.0 NAUSEA: ICD-10-CM

## 2022-10-06 LAB
ALBUMIN SERPL-MCNC: 4.1 GM/DL (ref 3.4–4.8)
ALBUMIN/GLOB SERPL: 1.3 RATIO (ref 1.1–2)
ALP SERPL-CCNC: 72 UNIT/L (ref 40–150)
ALT SERPL-CCNC: 18 UNIT/L (ref 0–55)
AST SERPL-CCNC: 21 UNIT/L (ref 5–34)
BASOPHILS # BLD AUTO: 0.06 X10(3)/MCL (ref 0–0.2)
BASOPHILS NFR BLD AUTO: 0.9 %
BILIRUBIN DIRECT+TOT PNL SERPL-MCNC: 0.5 MG/DL
BUN SERPL-MCNC: 16.2 MG/DL (ref 9.8–20.1)
CALCIUM SERPL-MCNC: 10 MG/DL (ref 8.4–10.2)
CHLORIDE SERPL-SCNC: 105 MMOL/L (ref 98–107)
CO2 SERPL-SCNC: 29 MMOL/L (ref 23–31)
CREAT SERPL-MCNC: 1.07 MG/DL (ref 0.55–1.02)
CTP QC/QA: YES
EOSINOPHIL # BLD AUTO: 0.65 X10(3)/MCL (ref 0–0.9)
EOSINOPHIL NFR BLD AUTO: 10 %
ERYTHROCYTE [DISTWIDTH] IN BLOOD BY AUTOMATED COUNT: 14.5 % (ref 11.5–17)
GFR SERPLBLD CREATININE-BSD FMLA CKD-EPI: 54 MLS/MIN/1.73/M2
GLOBULIN SER-MCNC: 3.1 GM/DL (ref 2.4–3.5)
GLUCOSE SERPL-MCNC: 82 MG/DL (ref 82–115)
HCT VFR BLD AUTO: 38.4 % (ref 37–47)
HGB BLD-MCNC: 12.9 GM/DL (ref 12–16)
IMM GRANULOCYTES # BLD AUTO: 0.01 X10(3)/MCL (ref 0–0.04)
IMM GRANULOCYTES NFR BLD AUTO: 0.2 %
INR PPP: 3.9 (ref 2–3)
LYMPHOCYTES # BLD AUTO: 1.64 X10(3)/MCL (ref 0.6–4.6)
LYMPHOCYTES NFR BLD AUTO: 25.3 %
MCH RBC QN AUTO: 30.5 PG (ref 27–31)
MCHC RBC AUTO-ENTMCNC: 33.6 MG/DL (ref 33–36)
MCV RBC AUTO: 90.8 FL (ref 80–94)
MONOCYTES # BLD AUTO: 0.53 X10(3)/MCL (ref 0.1–1.3)
MONOCYTES NFR BLD AUTO: 8.2 %
NEUTROPHILS # BLD AUTO: 3.6 X10(3)/MCL (ref 2.1–9.2)
NEUTROPHILS NFR BLD AUTO: 55.4 %
NRBC BLD AUTO-RTO: 0 %
PLATELET # BLD AUTO: 179 X10(3)/MCL (ref 130–400)
PMV BLD AUTO: 12.5 FL (ref 7.4–10.4)
POTASSIUM SERPL-SCNC: 4.1 MMOL/L (ref 3.5–5.1)
PROT SERPL-MCNC: 7.2 GM/DL (ref 5.8–7.6)
PROTHROMBIN TIME, POC: 46.2 (ref 2–3)
RBC # BLD AUTO: 4.23 X10(6)/MCL (ref 4.2–5.4)
SODIUM SERPL-SCNC: 143 MMOL/L (ref 136–145)
WBC # SPEC AUTO: 6.5 X10(3)/MCL (ref 4.5–11.5)

## 2022-10-06 PROCEDURE — 85610 PROTHROMBIN TIME: CPT

## 2022-10-06 PROCEDURE — 36415 COLL VENOUS BLD VENIPUNCTURE: CPT

## 2022-10-06 PROCEDURE — 80053 COMPREHEN METABOLIC PANEL: CPT

## 2022-10-06 PROCEDURE — 85025 COMPLETE CBC W/AUTO DIFF WBC: CPT

## 2022-10-06 PROCEDURE — 99211 OFF/OP EST MAY X REQ PHY/QHP: CPT

## 2022-10-06 NOTE — LETTER
October 6, 2022        Carlos Flores MD  [address]  [facility] [address]             [facility]  [address] [facility] [address]  Phone: [phone]  Fax: [phone]   Patient: Hafsa Jimenez   MR Number: [id number]   YOB: 1945   Date of Visit: 10/6/2022       Dear Dr. Flores:    Thank you for allowing me to care for  Hafsa Jimenez. Attached you will find pt/inr results and dosing.       If you have questions, please do not hesitate to call me. I look forward to following Hafsa Jimenez along with you.    Sincerely,      Shireen Mina RN            CC  No Recipients    Enclosure

## 2022-10-10 RX ORDER — FAMOTIDINE 20 MG/1
20 TABLET, FILM COATED ORAL 2 TIMES DAILY
COMMUNITY

## 2022-10-11 ENCOUNTER — HOSPITAL ENCOUNTER (OUTPATIENT)
Facility: HOSPITAL | Age: 77
Discharge: HOME | End: 2022-10-11
Attending: INTERNAL MEDICINE | Admitting: INTERNAL MEDICINE
Payer: MEDICARE

## 2022-10-11 ENCOUNTER — ANESTHESIA (OUTPATIENT)
Dept: ENDOSCOPY | Facility: HOSPITAL | Age: 77
End: 2022-10-11
Payer: MEDICARE

## 2022-10-11 ENCOUNTER — ANESTHESIA EVENT (OUTPATIENT)
Dept: ENDOSCOPY | Facility: HOSPITAL | Age: 77
End: 2022-10-11
Payer: MEDICARE

## 2022-10-11 VITALS
HEIGHT: 65 IN | TEMPERATURE: 98 F | RESPIRATION RATE: 22 BRPM | SYSTOLIC BLOOD PRESSURE: 114 MMHG | HEART RATE: 71 BPM | DIASTOLIC BLOOD PRESSURE: 52 MMHG | OXYGEN SATURATION: 99 % | WEIGHT: 130 LBS | BODY MASS INDEX: 21.66 KG/M2

## 2022-10-11 DIAGNOSIS — R10.9 ABDOMINAL PAIN, UNSPECIFIED ABDOMINAL LOCATION: Primary | ICD-10-CM

## 2022-10-11 DIAGNOSIS — R14.0 ABDOMINAL DISTENTION: ICD-10-CM

## 2022-10-11 DIAGNOSIS — Z86.010 PERSONAL HISTORY OF COLONIC POLYPS: ICD-10-CM

## 2022-10-11 DIAGNOSIS — K21.9 GASTROESOPHAGEAL REFLUX DISEASE WITHOUT ESOPHAGITIS: ICD-10-CM

## 2022-10-11 DIAGNOSIS — K62.5 HEMORRHAGE OF RECTUM AND ANUS: ICD-10-CM

## 2022-10-11 DIAGNOSIS — R11.0 NAUSEA: ICD-10-CM

## 2022-10-11 PROBLEM — Z86.0100 PERSONAL HISTORY OF COLONIC POLYPS: Status: ACTIVE | Noted: 2022-10-11

## 2022-10-11 LAB
POC PTINR: 1.2 (ref 0.9–1.2)
POC PTWBT: 14.1 SEC (ref 9.7–14.3)
SAMPLE: NORMAL

## 2022-10-11 PROCEDURE — 45378 DIAGNOSTIC COLONOSCOPY: CPT | Performed by: INTERNAL MEDICINE

## 2022-10-11 PROCEDURE — 37000008 HC ANESTHESIA 1ST 15 MINUTES: Performed by: INTERNAL MEDICINE

## 2022-10-11 PROCEDURE — 27201423 OPTIME MED/SURG SUP & DEVICES STERILE SUPPLY: Performed by: INTERNAL MEDICINE

## 2022-10-11 PROCEDURE — 43239 EGD BIOPSY SINGLE/MULTIPLE: CPT

## 2022-10-11 PROCEDURE — 63600175 PHARM REV CODE 636 W HCPCS

## 2022-10-11 PROCEDURE — 25000003 PHARM REV CODE 250: Performed by: ANESTHESIOLOGY

## 2022-10-11 PROCEDURE — 88305 TISSUE EXAM BY PATHOLOGIST: CPT | Performed by: INTERNAL MEDICINE

## 2022-10-11 PROCEDURE — 25000003 PHARM REV CODE 250

## 2022-10-11 PROCEDURE — 37000009 HC ANESTHESIA EA ADD 15 MINS: Performed by: INTERNAL MEDICINE

## 2022-10-11 RX ORDER — LIDOCAINE HCL/PF 100 MG/5ML
SYRINGE (ML) INTRAVENOUS
Status: DISCONTINUED | OUTPATIENT
Start: 2022-10-11 | End: 2022-10-11

## 2022-10-11 RX ORDER — PROPOFOL 10 MG/ML
INJECTION, EMULSION INTRAVENOUS
Status: DISCONTINUED | OUTPATIENT
Start: 2022-10-11 | End: 2022-10-11

## 2022-10-11 RX ORDER — ONDANSETRON 2 MG/ML
4 INJECTION INTRAMUSCULAR; INTRAVENOUS DAILY PRN
Status: DISCONTINUED | OUTPATIENT
Start: 2022-10-11 | End: 2022-10-11 | Stop reason: HOSPADM

## 2022-10-11 RX ORDER — DIPHENHYDRAMINE HYDROCHLORIDE 50 MG/ML
25 INJECTION INTRAMUSCULAR; INTRAVENOUS EVERY 6 HOURS PRN
Status: DISCONTINUED | OUTPATIENT
Start: 2022-10-11 | End: 2022-10-11 | Stop reason: HOSPADM

## 2022-10-11 RX ORDER — ACETAMINOPHEN 10 MG/ML
1000 INJECTION, SOLUTION INTRAVENOUS ONCE
Status: DISCONTINUED | OUTPATIENT
Start: 2022-10-11 | End: 2022-10-11 | Stop reason: HOSPADM

## 2022-10-11 RX ORDER — PROPOFOL 10 MG/ML
VIAL (ML) INTRAVENOUS
Status: COMPLETED
Start: 2022-10-11 | End: 2022-10-11

## 2022-10-11 RX ORDER — SODIUM CHLORIDE, SODIUM GLUCONATE, SODIUM ACETATE, POTASSIUM CHLORIDE AND MAGNESIUM CHLORIDE 30; 37; 368; 526; 502 MG/100ML; MG/100ML; MG/100ML; MG/100ML; MG/100ML
1000 INJECTION, SOLUTION INTRAVENOUS CONTINUOUS
Status: DISCONTINUED | OUTPATIENT
Start: 2022-10-11 | End: 2022-10-11 | Stop reason: HOSPADM

## 2022-10-11 RX ORDER — LIDOCAINE HYDROCHLORIDE 10 MG/ML
1 INJECTION, SOLUTION EPIDURAL; INFILTRATION; INTRACAUDAL; PERINEURAL ONCE
Status: DISCONTINUED | OUTPATIENT
Start: 2022-10-11 | End: 2022-10-11 | Stop reason: HOSPADM

## 2022-10-11 RX ORDER — LIDOCAINE HYDROCHLORIDE 20 MG/ML
INJECTION, SOLUTION EPIDURAL; INFILTRATION; INTRACAUDAL; PERINEURAL
Status: DISCONTINUED
Start: 2022-10-11 | End: 2022-10-11 | Stop reason: HOSPADM

## 2022-10-11 RX ORDER — MIDAZOLAM HYDROCHLORIDE 1 MG/ML
2 INJECTION INTRAMUSCULAR; INTRAVENOUS ONCE AS NEEDED
Status: DISCONTINUED | OUTPATIENT
Start: 2022-10-11 | End: 2022-10-11 | Stop reason: HOSPADM

## 2022-10-11 RX ADMIN — PROPOFOL 50 MG: 10 INJECTION, EMULSION INTRAVENOUS at 10:10

## 2022-10-11 RX ADMIN — SODIUM CHLORIDE, SODIUM GLUCONATE, SODIUM ACETATE, POTASSIUM CHLORIDE AND MAGNESIUM CHLORIDE: 526; 502; 368; 37; 30 INJECTION, SOLUTION INTRAVENOUS at 10:10

## 2022-10-11 RX ADMIN — LIDOCAINE HYDROCHLORIDE 50 MG: 20 INJECTION, SOLUTION INTRAVENOUS at 10:10

## 2022-10-11 RX ADMIN — SODIUM CHLORIDE, SODIUM GLUCONATE, SODIUM ACETATE, POTASSIUM CHLORIDE AND MAGNESIUM CHLORIDE 1000 ML: 526; 502; 368; 37; 30 INJECTION, SOLUTION INTRAVENOUS at 10:10

## 2022-10-11 NOTE — DISCHARGE SUMMARY
[facility]  Discharge Note  Short Stay    Procedure(s) (LRB):  EGD (DOUBLE) (N/A)  COLON (N/A)      OUTCOME: Patient tolerated treatment/procedure well without complication and is now ready for discharge.    DISPOSITION: Home or Self Care    FINAL DIAGNOSIS:  Abdominal pain    FOLLOWUP: In clinic    DISCHARGE INSTRUCTIONS:    Discharge Procedure Orders   Diet general        TIME SPENT ON DISCHARGE: 7 minutes    Can resume Coumadin this week on Thursday if no evidence of GI bleed.

## 2022-10-11 NOTE — PROCEDURES
Hafsa Jimenez   MRN: [id number]   ADMISSION DATE: 10/11/2022  : 1945  AGE: 77 y.o.    DATE OF PROCEDURE:  10/11/2022     PROCEDURE: EGD with biopsy    SURGEON: LILLIAN GAMBINO    PREOPERATIVE DIAGNOSIS: Upper abdominal pain, nausea, heartburn, GERD, bloating    POSTOPERATIVE DIAGNOSIS:  1. Hiatal hernia, small.  2. Multiple small gastric polyps, benign appearing.  Multiple biopsies were obtained.  3. Antral biopsies were obtained for H pylori infection.  Otherwise normal exam    HISTORY AND PHYSICAL:  As per preoperative note.      DESCRIPTION OF PROCEDURE:  Informed consent was obtained.  Patient was placed in left lateral position.  Sedation per anesthesia service.  The Olympus video gastroscope was introduced into the oral cavity and esophagus was intubated under direct visualization. The scope was carefully advanced to the distal duodenum.  Patient tolerated the procedure well without any complications.    FINDINGS:  Esophagus:  Normal mucosa.  GE junction at approximately 37 cm.  There was a small hiatal hernia.    Stomach:  There were multiple small to medium size smooth surfaced benign-appearing polyps noted in the gastric fundus and body.  Biopsies were obtained.  Antral biopsies were obtained to rule H pylori infection.  Duodenum:  Duodenal bulb, 2nd and 3rd portion of the duodenum appeared unremarkable to the extent of exam.    ESTIMATED BLOOD LOSS:  2-3 cc.    COMPLICATIONS:  None.    RECOMMENDATIONS:  1. Follow biopsy results.    2.  H2 blockers or PPI as needed.  3.  Colonoscopy to follow

## 2022-10-11 NOTE — TRANSFER OF CARE
"Anesthesia Transfer of Care Note    Patient: Hafsa Jimenez    Procedure(s) Performed: Procedure(s) (LRB):  EGD (DOUBLE) (N/A)  COLON (N/A)    Patient location: GI    Anesthesia Type: general    Transport from OR: Transported from OR on room air with adequate spontaneous ventilation    Post pain: adequate analgesia    Post assessment: no apparent anesthetic complications and tolerated procedure well    Post vital signs: stable    Level of consciousness: awake    Nausea/Vomiting: no nausea/vomiting    Complications: none    Transfer of care protocol was followed      Last vitals:   Visit Vitals  /71 (BP Location: Right arm, Patient Position: Lying)   Pulse 68   Temp 36.9 °C (98.4 °F) (Tympanic)   Resp 19   Ht 5' 5" (1.651 m)   Wt 59 kg (130 lb)   SpO2 98%   BMI 21.63 kg/m²     "

## 2022-10-11 NOTE — ANESTHESIA PREPROCEDURE EVALUATION
10/11/2022  Hafsa Jimenez is a 77 y.o. female with abd pain and GERD for EGD and colonoscopy.  She has cardiac surgery and valve replacement in 2018, and has been doing well.  She is easily capable of greater than 4 mets.  INR pending.      Pre-op Assessment    I have reviewed the Patient Summary Reports.     I have reviewed the Nursing Notes. I have reviewed the NPO Status.   I have reviewed the Medications.     Review of Systems  Anesthesia Hx:  No problems with previous Anesthesia  Denies Family Hx of Anesthesia complications.   Denies Personal Hx of Anesthesia complications.   Cardiovascular:   Hypertension CAD      Pulmonary:  Pulmonary Normal    Hepatic/GI:   GERD    Neurological:   CVA        Physical Exam  General: Well nourished, Cooperative, Alert and Oriented    Airway:  Mallampati: II   Mouth Opening: Normal  TM Distance: Normal  Tongue: Normal  Neck ROM: Normal ROM    Dental:  Intact    Chest/Lungs:  Clear to auscultation, Normal Respiratory Rate    Heart:  Rate: Normal  Rhythm: Regular Rhythm        Anesthesia Plan  Type of Anesthesia, risks & benefits discussed:    Anesthesia Type: Gen Natural Airway  Intra-op Monitoring Plan: Standard ASA Monitors  Post Op Pain Control Plan: multimodal analgesia  Induction:  IV  Informed Consent: Informed consent signed with the Patient and all parties understand the risks and agree with anesthesia plan.  All questions answered.   ASA Score: 3  Day of Surgery Review of History & Physical: H&P Update referred to the surgeon/provider.    Ready For Surgery From Anesthesia Perspective.     .

## 2022-10-11 NOTE — PROCEDURES
Hafsa Jimenez   MRN: [id number]   ADMISSION DATE: 10/11/2022  : 1945  AGE: 77 y.o.    PROCEDURE:  Colonoscopy, diagnostic.     DATE OF PROCEDURE:  10/11/2022     SURGEON: LILLIAN GAMBINO    PREOPERATIVE DIAGNOSIS:  1. History of colon polyps.    2. History of rectal bleeding.    POSTOPERATIVE DIAGNOSIS:  1.  Pancolonic diverticula.    2. Internal hemorrhoids grade 2-3.  Otherwise normal exam.    HISTORY AND PHYSICAL:  As a preoperative note.      DESCRIPTION OF PROCEDURE:  Informed consent was obtained.  Patient was placed in left lateral position.  Sedation per the anesthesia service.  Rectal exam was unremarkable.  Olympus video colonoscope was introduced into the rectum and was carefully advanced to cecum.  Quality of the preparation was fair.  Periodic irrigation was done throughout the procedure with fairly good visualization.   Patient tolerated the procedure well without any complications.    FINDINGS:  There were multiple diverticula noted scattered throughout the colon.  Photodocumentation was obtained.  Cecum was clearly identified by the presence of visual landmarks including appendiceal orifice, ileocecal valve and cecal strap.  Terminal ileum was not intubated during the procedure.  No polyps or any other significant abnormality noted throughout the extent of the exam.  Grade 2-3 internal hemorrhoids noted on withdrawal of the scope.  Estimated withdrawal time from cecum to rectum was 8 minutes and 17 seconds.    ESTIMATED BLOOD LOSS:  Minimal (from scope rubbing against the mucosa)    COMPLICATIONS:  None.    RECOMMENDATIONS:  1. High-fiber diet.  2. Colonoscopy in 5 years if medical status permits.  3. High-fiber diet.  Metamucil as directed.  Avoid constipation.

## 2022-10-12 NOTE — ANESTHESIA POSTPROCEDURE EVALUATION
Anesthesia Post Evaluation    Patient: Hafsa Jimenez    Procedure(s) Performed: Procedure(s) (LRB):  EGD (DOUBLE) (N/A)  COLON (N/A)    Final Anesthesia Type: general      Patient location during evaluation: PACU  Patient participation: Yes- Able to Participate  Level of consciousness: awake and alert  Post-procedure vital signs: reviewed and stable  Pain management: adequate  Airway patency: patent      Anesthetic complications: no      Cardiovascular status: blood pressure returned to baseline  Respiratory status: unassisted  Hydration status: euvolemic  Follow-up not needed.          Vitals Value Taken Time   /52 10/11/22 1122   Temp ** 10/12/22 1401   Pulse 71 10/11/22 1123   Resp 15 10/11/22 1123   SpO2 99 % 10/11/22 1123   Vitals shown include unvalidated device data.      Event Time   Out of Recovery 11:11:07         Pain/Sandra Score: Sandra Score: 10 (10/11/2022 11:10 AM)

## 2022-10-13 LAB
DHEA SERPL-MCNC: NORMAL
ESTROGEN SERPL-MCNC: NORMAL PG/ML
INSULIN SERPL-ACNC: NORMAL U[IU]/ML
LAB AP CLINICAL INFORMATION: NORMAL
LAB AP GROSS DESCRIPTION: NORMAL
LAB AP REPORT FOOTNOTES: NORMAL
T3RU NFR SERPL: NORMAL %

## 2022-10-25 ENCOUNTER — ANTI-COAG VISIT (OUTPATIENT)
Dept: CARDIOLOGY | Facility: HOSPITAL | Age: 77
End: 2022-10-25
Payer: MEDICARE

## 2022-10-25 DIAGNOSIS — Z79.01 LONG TERM (CURRENT) USE OF ANTICOAGULANTS: ICD-10-CM

## 2022-10-25 DIAGNOSIS — I63.9 CEREBROVASCULAR ACCIDENT (CVA), UNSPECIFIED MECHANISM: ICD-10-CM

## 2022-10-25 DIAGNOSIS — Z95.2 H/O MITRAL VALVE REPLACEMENT WITH MECHANICAL VALVE: Primary | ICD-10-CM

## 2022-10-25 LAB
CTP QC/QA: YES
INR PPP: 3.2 (ref 2–3)
PROTHROMBIN TIME, POC: 38.6 (ref 2–3)

## 2022-10-25 PROCEDURE — 85610 PROTHROMBIN TIME: CPT

## 2022-10-25 NOTE — LETTER
October 25, 2022        Carlos Flores MD  [address]  [facility] [address]             [facility]  [address] [facility] [address]  Phone: [phone]  Fax: [phone]   Patient: Hafsa Jimenez   MR Number: [id number]   YOB: 1945   Date of Visit: 10/25/2022       Dear Dr. Flores:    Thank you for allowing me to care for  Hafsa Jimenez. Attached you will find pt/inr results and dosing.       If you have questions, please do not hesitate to call me. I look forward to following Hafsa Jimenez along with you.    Sincerely,      Shireen Mina RN            CC  No Recipients    Enclosure

## 2022-11-22 ENCOUNTER — ANTI-COAG VISIT (OUTPATIENT)
Dept: CARDIOLOGY | Facility: HOSPITAL | Age: 77
End: 2022-11-22
Payer: COMMERCIAL

## 2022-11-22 DIAGNOSIS — Z79.01 LONG TERM (CURRENT) USE OF ANTICOAGULANTS: ICD-10-CM

## 2022-11-22 DIAGNOSIS — Z95.2 H/O MITRAL VALVE REPLACEMENT WITH MECHANICAL VALVE: Primary | ICD-10-CM

## 2022-11-22 DIAGNOSIS — I63.9 CEREBROVASCULAR ACCIDENT (CVA), UNSPECIFIED MECHANISM: ICD-10-CM

## 2022-11-22 LAB
CTP QC/QA: YES
INR PPP: 4.5 (ref 2–3)
PROTHROMBIN TIME, POC: 54.3 (ref 2–3)

## 2022-11-22 PROCEDURE — 85610 PROTHROMBIN TIME: CPT

## 2022-11-22 PROCEDURE — 99211 OFF/OP EST MAY X REQ PHY/QHP: CPT

## 2022-11-22 NOTE — LETTER
November 22, 2022        Carlos Flores MD  [address]  [facility] [address]             [facility]  [address] [address]  Phone: [phone]  Fax: [phone]   Patient: Hafsa Jimenez   MR Number: [id number]   YOB: 1945   Date of Visit: 11/22/2022       Dear Dr. Flores:    Thank you for allowing me to care for  Hafsa Jimenez. Attached you will find pt/inr results and dosing.       If you have questions, please do not hesitate to call me. I look forward to following Hafsa Jimenez along with you.    Sincerely,      Shireen Mina RN            CC  No Recipients    Enclosure

## 2022-12-12 ENCOUNTER — ANTI-COAG VISIT (OUTPATIENT)
Dept: CARDIOLOGY | Facility: HOSPITAL | Age: 77
End: 2022-12-12
Payer: MEDICARE

## 2022-12-12 DIAGNOSIS — Z79.01 LONG TERM (CURRENT) USE OF ANTICOAGULANTS: ICD-10-CM

## 2022-12-12 DIAGNOSIS — I63.9 CEREBROVASCULAR ACCIDENT (CVA), UNSPECIFIED MECHANISM: ICD-10-CM

## 2022-12-12 DIAGNOSIS — Z95.2 H/O MITRAL VALVE REPLACEMENT WITH MECHANICAL VALVE: Primary | ICD-10-CM

## 2022-12-12 LAB
CTP QC/QA: YES
INR PPP: 4 (ref 2–3)
PROTHROMBIN TIME, POC: 47.8 (ref 2–3)

## 2022-12-12 PROCEDURE — 85610 PROTHROMBIN TIME: CPT

## 2022-12-12 PROCEDURE — 99211 OFF/OP EST MAY X REQ PHY/QHP: CPT

## 2022-12-12 NOTE — LETTER
December 12, 2022        Carlos Flores MD  [address]  [facility] [address]             [facility]  [address] [facility] [facility] [address]  Phone: [phone]  Fax: [phone]   Patient: Hafsa Jimenez   MR Number: [id number]   YOB: 1945   Date of Visit: 12/12/2022       Dear Dr. Flores:    Thank you for allowing me to care for  Hafsa Jimenez. Attached you will find pt/inr results and dosing.       If you have questions, please do not hesitate to call me. I look forward to following Hafsa Jimenez along with you.    Sincerely,      Shireen Mina RN            CC  No Recipients    Enclosure

## 2023-01-16 PROBLEM — K62.5 HEMORRHAGE OF RECTUM AND ANUS: Status: RESOLVED | Noted: 2022-10-11 | Resolved: 2023-01-16

## 2023-02-07 ENCOUNTER — ANTI-COAG VISIT (OUTPATIENT)
Dept: CARDIOLOGY | Facility: HOSPITAL | Age: 78
End: 2023-02-07
Payer: MEDICARE

## 2023-02-07 DIAGNOSIS — Z79.01 LONG TERM (CURRENT) USE OF ANTICOAGULANTS: ICD-10-CM

## 2023-02-07 DIAGNOSIS — Z95.2 H/O MITRAL VALVE REPLACEMENT WITH MECHANICAL VALVE: ICD-10-CM

## 2023-02-07 DIAGNOSIS — I63.9 CEREBROVASCULAR ACCIDENT (CVA), UNSPECIFIED MECHANISM: Primary | ICD-10-CM

## 2023-02-07 LAB
CTP QC/QA: YES
INR PPP: 2.2 (ref 2–3)
PROTHROMBIN TIME, POC: 26.8 (ref 2–3)

## 2023-02-07 PROCEDURE — 85610 PROTHROMBIN TIME: CPT

## 2023-02-07 PROCEDURE — 99211 OFF/OP EST MAY X REQ PHY/QHP: CPT

## 2023-02-07 NOTE — LETTER
February 7, 2023        Carlos Flores MD  [address]  [facility] [address]             [facility]  [address] [facility] [address]  Phone: [phone]  Fax: [phone]   Patient: Hafsa Jimenez   MR Number: [id number]   YOB: 1945   Date of Visit: 2/7/2023       Dear Dr. Flores:    Thank you for allowing me to care for  Hafsa Jimenez. Attached you will find pt/inr results and dosing.     If you have questions, please do not hesitate to call me. I look forward to following Hafsa Jimenez along with you.    Sincerely,      Shireen Mina RN            CC  No Recipients    Enclosure

## 2023-02-27 ENCOUNTER — ANTI-COAG VISIT (OUTPATIENT)
Dept: CARDIOLOGY | Facility: HOSPITAL | Age: 78
End: 2023-02-27
Payer: COMMERCIAL

## 2023-02-27 DIAGNOSIS — I63.9 CEREBROVASCULAR ACCIDENT (CVA), UNSPECIFIED MECHANISM: ICD-10-CM

## 2023-02-27 DIAGNOSIS — Z79.01 LONG TERM (CURRENT) USE OF ANTICOAGULANTS: ICD-10-CM

## 2023-02-27 DIAGNOSIS — Z95.2 H/O MITRAL VALVE REPLACEMENT WITH MECHANICAL VALVE: Primary | ICD-10-CM

## 2023-02-27 LAB
CTP QC/QA: YES
INR PPP: 2.9 (ref 2–3)
PROTHROMBIN TIME, POC: 34.2 (ref 2–3)

## 2023-02-27 PROCEDURE — 85610 PROTHROMBIN TIME: CPT

## 2023-02-27 NOTE — LETTER
February 27, 2023        Carlos Flores MD  [address]  [facility] [address]             [facility]  [address] [facility] [address]  Phone: [phone]  Fax: [phone]   Patient: Hafsa Jimenez   MR Number: [id number]   YOB: 1945   Date of Visit: 2/27/2023       Dear Dr. Flores:    Thank you for allowing me to care for  Hafsa Jimenez. Attached you will find pt/inr results and dosing.     If you have questions, please do not hesitate to call me. I look forward to following Hafsa Jimenez along with you.    Sincerely,      Shireen Mina RN            CC  No Recipients    Enclosure

## 2023-03-27 ENCOUNTER — ANTI-COAG VISIT (OUTPATIENT)
Dept: CARDIOLOGY | Facility: HOSPITAL | Age: 78
End: 2023-03-27
Payer: COMMERCIAL

## 2023-03-27 DIAGNOSIS — Z79.01 LONG TERM (CURRENT) USE OF ANTICOAGULANTS: ICD-10-CM

## 2023-03-27 DIAGNOSIS — Z95.2 H/O MITRAL VALVE REPLACEMENT WITH MECHANICAL VALVE: Primary | ICD-10-CM

## 2023-03-27 DIAGNOSIS — I63.9 CEREBROVASCULAR ACCIDENT (CVA), UNSPECIFIED MECHANISM: ICD-10-CM

## 2023-03-27 LAB
CTP QC/QA: YES
INR PPP: 3.1 (ref 2–3)
PROTHROMBIN TIME, POC: 37.2 (ref 2–3)

## 2023-03-27 PROCEDURE — 85610 PROTHROMBIN TIME: CPT

## 2023-03-27 PROCEDURE — 99211 OFF/OP EST MAY X REQ PHY/QHP: CPT

## 2023-03-27 NOTE — LETTER
March 27, 2023        Carlos Flores MD  [address]  [facility] [address]             [facility]  [address] [address]  Phone: [phone]  Fax: [phone]   Patient: Hafsa Jimenez   MR Number: [id number]   YOB: 1945   Date of Visit: 3/27/2023       Dear Dr. Flores:      Thank you for allowing me to care for  Hafsa Jimenez. Attached you will find pt/inr results and dosing.     If you have questions, please do not hesitate to call me. I look forward to following Hafsa Jimenez along with you.    Sincerely,      Shireen Mina RN            CC  No Recipients    Enclosure

## 2023-04-11 DIAGNOSIS — I25.10 CORONARY ATHEROSCLEROSIS OF NATIVE CORONARY ARTERY: Primary | ICD-10-CM

## 2023-04-17 ENCOUNTER — HOSPITAL ENCOUNTER (OUTPATIENT)
Dept: RADIOLOGY | Facility: HOSPITAL | Age: 78
Discharge: HOME | End: 2023-04-17
Attending: FAMILY MEDICINE
Payer: MEDICARE

## 2023-04-17 DIAGNOSIS — I25.10 CORONARY ATHEROSCLEROSIS OF NATIVE CORONARY ARTERY: ICD-10-CM

## 2023-04-17 LAB
CREAT SERPL-MCNC: 1.1 MG/DL (ref 0.5–1.4)
SAMPLE: NORMAL

## 2023-04-17 PROCEDURE — 70498 CT ANGIOGRAPHY NECK: CPT | Mod: TC

## 2023-04-17 PROCEDURE — 25500020 PHARM REV CODE 255: Performed by: FAMILY MEDICINE

## 2023-04-17 RX ADMIN — IOPAMIDOL 100 ML: 755 INJECTION, SOLUTION INTRAVENOUS at 09:04

## 2023-04-24 ENCOUNTER — ANTI-COAG VISIT (OUTPATIENT)
Dept: CARDIOLOGY | Facility: HOSPITAL | Age: 78
End: 2023-04-24
Payer: COMMERCIAL

## 2023-04-24 DIAGNOSIS — Z95.2 H/O MITRAL VALVE REPLACEMENT WITH MECHANICAL VALVE: Primary | ICD-10-CM

## 2023-04-24 DIAGNOSIS — I63.9 CEREBROVASCULAR ACCIDENT (CVA), UNSPECIFIED MECHANISM: ICD-10-CM

## 2023-04-24 DIAGNOSIS — Z79.01 LONG TERM (CURRENT) USE OF ANTICOAGULANTS: ICD-10-CM

## 2023-04-24 LAB
CTP QC/QA: YES
INR PPP: 2.3 (ref 2–3)
PROTHROMBIN TIME, POC: 27.6 (ref 2–3)

## 2023-04-24 PROCEDURE — 85610 PROTHROMBIN TIME: CPT

## 2023-04-24 PROCEDURE — 99211 OFF/OP EST MAY X REQ PHY/QHP: CPT

## 2023-04-24 NOTE — LETTER
April 24, 2023        Carlos Flores MD  [address]  [facility] [address]             [facility]  [address] [address]  Phone: [phone]  Fax: [phone]   Patient: Hafsa Jimenez   MR Number: [id number]   YOB: 1945   Date of Visit: 4/24/2023       Dear Dr. Flores:    Thank you for allowing me to care for  Hafsa Jimenez. Attached you will find pt/inr results and dosing.     If you have questions, please do not hesitate to call me. I look forward to following Hafsa Jimenez along with you.    Sincerely,      Shireen Mina RN            CC  No Recipients    Enclosure

## 2023-05-08 ENCOUNTER — ANTI-COAG VISIT (OUTPATIENT)
Dept: CARDIOLOGY | Facility: HOSPITAL | Age: 78
End: 2023-05-08
Payer: COMMERCIAL

## 2023-05-08 DIAGNOSIS — Z95.2 H/O MITRAL VALVE REPLACEMENT WITH MECHANICAL VALVE: Primary | ICD-10-CM

## 2023-05-08 DIAGNOSIS — Z79.01 LONG TERM (CURRENT) USE OF ANTICOAGULANTS: ICD-10-CM

## 2023-05-08 DIAGNOSIS — I63.9 CEREBROVASCULAR ACCIDENT (CVA), UNSPECIFIED MECHANISM: ICD-10-CM

## 2023-05-08 LAB
CTP QC/QA: YES
INR PPP: 2.4 (ref 2–3)
PROTHROMBIN TIME, POC: 28.5 (ref 2–3)

## 2023-05-08 PROCEDURE — 85610 PROTHROMBIN TIME: CPT

## 2023-05-08 PROCEDURE — 99211 OFF/OP EST MAY X REQ PHY/QHP: CPT

## 2023-05-08 NOTE — LETTER
May 8, 2023        Carlos Flores MD  [address]  [facility] [address]             [facility]  [address] [facility] [address]  Phone: [phone]  Fax: [phone]   Patient: Hafsa Jimenez   MR Number: [id number]   YOB: 1945   Date of Visit: 5/8/2023       Dear Dr. Flores:      Thank you for allowing me to care for  Hafsa Jimenez. Attached you will find pt/inr results and dosing.     If you have questions, please do not hesitate to call me. I look forward to following Hafsa Jimenez along with you.    Sincerely,      Shireen Mina RN            CC  No Recipients    Enclosure

## 2024-08-06 ENCOUNTER — HOSPITAL ENCOUNTER (EMERGENCY)
Facility: HOSPITAL | Age: 79
Discharge: HOME | End: 2024-08-06
Attending: STUDENT IN AN ORGANIZED HEALTH CARE EDUCATION/TRAINING PROGRAM
Payer: MEDICARE

## 2024-08-06 VITALS
OXYGEN SATURATION: 95 % | SYSTOLIC BLOOD PRESSURE: 156 MMHG | RESPIRATION RATE: 13 BRPM | BODY MASS INDEX: 19.16 KG/M2 | WEIGHT: 115 LBS | HEIGHT: 65 IN | TEMPERATURE: 98 F | DIASTOLIC BLOOD PRESSURE: 86 MMHG | HEART RATE: 67 BPM

## 2024-08-06 DIAGNOSIS — R51.9 NONINTRACTABLE HEADACHE, UNSPECIFIED CHRONICITY PATTERN, UNSPECIFIED HEADACHE TYPE: Primary | ICD-10-CM

## 2024-08-06 LAB
ALBUMIN SERPL-MCNC: 3.7 G/DL (ref 3.4–4.8)
ALBUMIN/GLOB SERPL: 1.2 RATIO (ref 1.1–2)
ALP SERPL-CCNC: 75 UNIT/L (ref 40–150)
ALT SERPL-CCNC: 14 UNIT/L (ref 0–55)
ANION GAP SERPL CALC-SCNC: 7 MEQ/L
APTT PPP: 24.5 SECONDS (ref 23.2–33.7)
AST SERPL-CCNC: 18 UNIT/L (ref 5–34)
BACTERIA #/AREA URNS AUTO: ABNORMAL /HPF
BASOPHILS # BLD AUTO: 0.04 X10(3)/MCL
BASOPHILS NFR BLD AUTO: 0.6 %
BILIRUB SERPL-MCNC: 0.2 MG/DL
BILIRUB UR QL STRIP.AUTO: NEGATIVE
BUN SERPL-MCNC: 17.2 MG/DL (ref 9.8–20.1)
CALCIUM SERPL-MCNC: 9.2 MG/DL (ref 8.4–10.2)
CHLORIDE SERPL-SCNC: 108 MMOL/L (ref 98–107)
CLARITY UR: CLEAR
CO2 SERPL-SCNC: 26 MMOL/L (ref 23–31)
COLOR UR AUTO: ABNORMAL
CREAT SERPL-MCNC: 1.13 MG/DL (ref 0.55–1.02)
CREAT/UREA NIT SERPL: 15
EOSINOPHIL # BLD AUTO: 1.15 X10(3)/MCL (ref 0–0.9)
EOSINOPHIL NFR BLD AUTO: 17.4 %
ERYTHROCYTE [DISTWIDTH] IN BLOOD BY AUTOMATED COUNT: 14.1 % (ref 11.5–17)
GFR SERPLBLD CREATININE-BSD FMLA CKD-EPI: 51 ML/MIN/1.73/M2
GLOBULIN SER-MCNC: 3.1 GM/DL (ref 2.4–3.5)
GLUCOSE SERPL-MCNC: 96 MG/DL (ref 82–115)
GLUCOSE UR QL STRIP: NORMAL
HCT VFR BLD AUTO: 35 % (ref 37–47)
HGB BLD-MCNC: 11.7 G/DL (ref 12–16)
HGB UR QL STRIP: NEGATIVE
IMM GRANULOCYTES # BLD AUTO: 0.01 X10(3)/MCL (ref 0–0.04)
IMM GRANULOCYTES NFR BLD AUTO: 0.2 %
INR PPP: 1
KETONES UR QL STRIP: NEGATIVE
LEUKOCYTE ESTERASE UR QL STRIP: 25
LYMPHOCYTES # BLD AUTO: 1.72 X10(3)/MCL (ref 0.6–4.6)
LYMPHOCYTES NFR BLD AUTO: 26 %
MCH RBC QN AUTO: 30.5 PG (ref 27–31)
MCHC RBC AUTO-ENTMCNC: 33.4 G/DL (ref 33–36)
MCV RBC AUTO: 91.1 FL (ref 80–94)
MONOCYTES # BLD AUTO: 0.55 X10(3)/MCL (ref 0.1–1.3)
MONOCYTES NFR BLD AUTO: 8.3 %
MUCOUS THREADS URNS QL MICRO: ABNORMAL /LPF
NEUTROPHILS # BLD AUTO: 3.15 X10(3)/MCL (ref 2.1–9.2)
NEUTROPHILS NFR BLD AUTO: 47.5 %
NITRITE UR QL STRIP: NEGATIVE
NRBC BLD AUTO-RTO: 0 %
PH UR STRIP: 5.5 [PH]
PLATELET # BLD AUTO: 177 X10(3)/MCL (ref 130–400)
PLATELETS.RETICULATED NFR BLD AUTO: 2.3 % (ref 0.9–11.2)
PMV BLD AUTO: 10.6 FL (ref 7.4–10.4)
POTASSIUM SERPL-SCNC: 4.6 MMOL/L (ref 3.5–5.1)
PROT SERPL-MCNC: 6.8 GM/DL (ref 5.8–7.6)
PROT UR QL STRIP: ABNORMAL
PROTHROMBIN TIME: 13.1 SECONDS (ref 12.5–14.5)
RBC # BLD AUTO: 3.84 X10(6)/MCL (ref 4.2–5.4)
RBC #/AREA URNS AUTO: ABNORMAL /HPF
SODIUM SERPL-SCNC: 141 MMOL/L (ref 136–145)
SP GR UR STRIP.AUTO: 1.03 (ref 1–1.03)
SQUAMOUS #/AREA URNS LPF: ABNORMAL /HPF
UROBILINOGEN UR STRIP-ACNC: NORMAL
WBC # BLD AUTO: 6.62 X10(3)/MCL (ref 4.5–11.5)
WBC #/AREA URNS AUTO: ABNORMAL /HPF

## 2024-08-06 PROCEDURE — 63600175 PHARM REV CODE 636 W HCPCS: Performed by: NURSE PRACTITIONER

## 2024-08-06 PROCEDURE — 99285 EMERGENCY DEPT VISIT HI MDM: CPT | Mod: 25

## 2024-08-06 PROCEDURE — 25000003 PHARM REV CODE 250: Performed by: NURSE PRACTITIONER

## 2024-08-06 PROCEDURE — 81001 URINALYSIS AUTO W/SCOPE: CPT

## 2024-08-06 PROCEDURE — 85610 PROTHROMBIN TIME: CPT

## 2024-08-06 PROCEDURE — 96374 THER/PROPH/DIAG INJ IV PUSH: CPT

## 2024-08-06 PROCEDURE — 85730 THROMBOPLASTIN TIME PARTIAL: CPT

## 2024-08-06 PROCEDURE — 96375 TX/PRO/DX INJ NEW DRUG ADDON: CPT

## 2024-08-06 PROCEDURE — 96361 HYDRATE IV INFUSION ADD-ON: CPT

## 2024-08-06 PROCEDURE — 80053 COMPREHEN METABOLIC PANEL: CPT

## 2024-08-06 PROCEDURE — 85025 COMPLETE CBC W/AUTO DIFF WBC: CPT

## 2024-08-06 RX ORDER — DIPHENHYDRAMINE HYDROCHLORIDE 50 MG/ML
12.5 INJECTION INTRAMUSCULAR; INTRAVENOUS
Status: COMPLETED | OUTPATIENT
Start: 2024-08-06 | End: 2024-08-06

## 2024-08-06 RX ORDER — PROCHLORPERAZINE EDISYLATE 5 MG/ML
10 INJECTION INTRAMUSCULAR; INTRAVENOUS
Status: COMPLETED | OUTPATIENT
Start: 2024-08-06 | End: 2024-08-06

## 2024-08-06 RX ADMIN — SODIUM CHLORIDE 1000 ML: 9 INJECTION, SOLUTION INTRAVENOUS at 10:08

## 2024-08-06 RX ADMIN — PROCHLORPERAZINE EDISYLATE 10 MG: 5 INJECTION INTRAMUSCULAR; INTRAVENOUS at 10:08

## 2024-08-06 RX ADMIN — DIPHENHYDRAMINE HYDROCHLORIDE 12.5 MG: 50 INJECTION INTRAMUSCULAR; INTRAVENOUS at 10:08

## 2024-08-07 NOTE — FIRST PROVIDER EVALUATION
"Medical screening examination initiated.  I have conducted a focused provider triage encounter, findings are as follows:    Brief history of present illness:  78 year old female presents to ER with c/o right sided headache since yesterday.  Reports nausea Denies weakness    Vitals:    08/06/24 1910   BP: (!) 143/76   Pulse: 69   Resp: 16   Temp: 98.2 °F (36.8 °C)   TempSrc: Oral   SpO2: 99%   Weight: 52.2 kg (115 lb)   Height: 5' 5" (1.651 m)       Pertinent physical exam:  Awake and alert, NAD    Brief workup plan:  Labs    Preliminary workup initiated; this workup will be continued and followed by the physician or advanced practice provider that is assigned to the patient when roomed.  "

## 2024-08-07 NOTE — ED PROVIDER NOTES
Encounter Date: 8/6/2024       History     Chief Complaint   Patient presents with    Headache     Pt. C/o right side headache that began yesterday. Pt. Denies trauma. +Nausea, -vomiting, -photosensitivity. No focal deficit noted. No numbness/tingling noted, no facial droop noted. States hx of CVA, currently takes baby aspirin daily.      See MDM    The history is provided by the patient. No  was used.     Review of patient's allergies indicates:   Allergen Reactions    Iodine      Iodine containing compounds (Severe)    Ezetimibe     Vancomycin      Past Medical History:   Diagnosis Date    Abdominal distention     (gaseous)    Acute ischemic heart disease     Acute posthemorrhagic anemia     Arthritis     Atherosclerotic heart disease of native coronary artery without angina pectoris     Atrial flutter, unspecified     CAD in native artery     CVA (cerebral vascular accident)     Hx    Diverticulosis of large intestine without perforation or abscess without bleeding     Edema, unspecified     Gas pain     Gastritis, unspecified, without bleeding     Gastro-esophageal reflux disease without esophagitis     GERD (gastroesophageal reflux disease)     Chronic    Hemorrhage of anus and rectum     History of mitral valve replacement with bioprosthetic valve     Hyperlipemia     unspecified    Hypertension     Internal hemorrhoids without complication     Nausea     Personal history of colonic polyps     Rectal polyp     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     Rheumatic disease of mitral valve     S/P CABG x 2     Unspecified abdominal pain     Vitamin D deficiency      Past Surgical History:   Procedure Laterality Date    APPENDECTOMY      COLONOSCOPY      COLONOSCOPY N/A 10/11/2022    Procedure: COLON;  Surgeon: Manish Perez MD;  Location: [facility] ENDOSCOPY;  Service: Gastroenterology;  Laterality: N/A;    EGD with small bowel enteroscopy  11/19/2018    ESOPHAGOGASTRODUODENOSCOPY   11/19/2018    HERNIA REPAIR  1969    HYSTERECTOMY  1982    LEFT HEART CATHETERIZATION      Open heart surgery       Family History   Problem Relation Name Age of Onset    Hypertension Mother      Arthritis Mother      Glaucoma Father      Arthritis Father      Hypertension Other          Sibling    Thyroid disease Other          Sibling     Social History     Tobacco Use    Smoking status: Never    Smokeless tobacco: Never   Substance Use Topics    Alcohol use: Not Currently    Drug use: Not Currently     Review of Systems   Constitutional:  Negative for fever.   Respiratory:  Negative for cough and shortness of breath.    Cardiovascular:  Negative for chest pain.   Gastrointestinal:  Negative for abdominal pain.   Genitourinary:  Negative for difficulty urinating and dysuria.   Musculoskeletal:  Negative for gait problem.   Skin:  Negative for color change.   Neurological:  Positive for headaches. Negative for dizziness and speech difficulty.   Psychiatric/Behavioral:  Negative for hallucinations and suicidal ideas.    All other systems reviewed and are negative.      Physical Exam     Initial Vitals [08/06/24 1910]   BP Pulse Resp Temp SpO2   (!) 143/76 69 16 98.2 °F (36.8 °C) 99 %      MAP       --         Physical Exam    Nursing note and vitals reviewed.  Constitutional: She appears well-developed and well-nourished.   HENT:   Head: Normocephalic.   Eyes: EOM are normal.   Neck:   Normal range of motion.  Cardiovascular:  Normal rate, regular rhythm, normal heart sounds and intact distal pulses.           Pulmonary/Chest: Breath sounds normal. No respiratory distress.   Abdominal: Abdomen is soft. Bowel sounds are normal. There is no abdominal tenderness.   Musculoskeletal:         General: Normal range of motion.      Cervical back: Normal range of motion.     Neurological: She is alert and oriented to person, place, and time. She has normal strength.   Skin: Skin is warm and dry.   Psychiatric: She has a  normal mood and affect. Her behavior is normal. Judgment and thought content normal.         ED Course   Procedures  Labs Reviewed   COMPREHENSIVE METABOLIC PANEL - Abnormal       Result Value    Sodium 141      Potassium 4.6      Chloride 108 (*)     CO2 26      Glucose 96      Blood Urea Nitrogen 17.2      Creatinine 1.13 (*)     Calcium 9.2      Protein Total 6.8      Albumin 3.7      Globulin 3.1      Albumin/Globulin Ratio 1.2      Bilirubin Total 0.2      ALP 75      ALT 14      AST 18      eGFR 51      Anion Gap 7.0      BUN/Creatinine Ratio 15     CBC WITH DIFFERENTIAL - Abnormal    WBC 6.62      RBC 3.84 (*)     Hgb 11.7 (*)     Hct 35.0 (*)     MCV 91.1      MCH 30.5      MCHC 33.4      RDW 14.1      Platelet 177      MPV 10.6 (*)     Neut % 47.5      Lymph % 26.0      Mono % 8.3      Eos % 17.4      Basophil % 0.6      Lymph # 1.72      Neut # 3.15      Mono # 0.55      Eos # 1.15 (*)     Baso # 0.04      IG# 0.01      IG% 0.2      NRBC% 0.0      IPF 2.3     URINALYSIS, REFLEX TO URINE CULTURE - Abnormal    Color, UA Light-Yellow      Appearance, UA Clear      Specific Gravity, UA 1.026      pH, UA 5.5      Protein, UA Trace (*)     Glucose, UA Normal      Ketones, UA Negative      Blood, UA Negative      Bilirubin, UA Negative      Urobilinogen, UA Normal      Nitrites, UA Negative      Leukocyte Esterase, UA 25 (*)     RBC, UA 0-5      WBC, UA 0-5      Bacteria, UA None Seen      Squamous Epithelial Cells, UA Trace      Mucous, UA Trace (*)    APTT - Normal    PTT 24.5     PROTIME-INR - Normal    PT 13.1      INR 1.0     CBC W/ AUTO DIFFERENTIAL    Narrative:     The following orders were created for panel order CBC auto differential.  Procedure                               Abnormality         Status                     ---------                               -----------         ------                     CBC with Differential[[id number]]        Abnormal            Final result                 Please  view results for these tests on the individual orders.          Imaging Results              CT Head Without Contrast (Final result)  Result time 08/06/24 19:31:31      Final result by Tamie Deshpande MD (08/06/24 19:31:31)                   Impression:      1. No acute intracranial abnormality.  2. Chronic microvascular ischemic changes.      Electronically signed by: Tamie Deshpande  Date:    08/06/2024  Time:    19:31               Narrative:    EXAMINATION:  CT HEAD WITHOUT CONTRAST    CLINICAL HISTORY:  Dizziness, persistent/recurrent, cardiac or vascular cause suspected;    TECHNIQUE:  Axial scans were obtained from skull base to the vertex.    Coronal and sagittal reconstructions obtained from the axial data.    Automatic exposure control was utilized to limit radiation dose.    Contrast: None    Radiation Dose:    Total DLP: 1062 mGy*cm    COMPARISON:  MRI brain dated 09/16/2018    FINDINGS:  There is no acute intracranial hemorrhage or edema. The gray-white matter differentiation is preserved.  Scattered hypodensities in the subcortical and periventricular white matter likely represent chronic microvascular ischemic changes.    There is no mass effect or midline shift. The ventricles and sulci are normal in size. The basal cisterns are patent. There is no abnormal extra-axial fluid collection.  Carotid and vertebral artery calcifications are noted.    The calvarium and skull base are intact. The visualized paranasal sinuses and the mastoid air cells are clear.                                       Medications   sodium chloride 0.9% bolus 1,000 mL 1,000 mL (1,000 mLs Intravenous New Bag 8/6/24 2238)   prochlorperazine injection Soln 10 mg (10 mg Intravenous Given 8/6/24 2237)   diphenhydrAMINE injection 12.5 mg (12.5 mg Intravenous Given 8/6/24 2238)     Medical Decision Making  Historian:  Patient.  Patient is a Black or  78 y.o. female that presents with headache that has been present  yesterday. Associated symptoms nothing. Surrounding information is nothing. Exacerbated by nothing. Relieved by nothing. Patient treatment prior to arrival Tylenol. Risk factors include none. Other history pertaining to this complaint nothing.   Assessment:  See physical exam.  DD:  Headache, space-occupying lesion, intracranial hemorrhage  ED Course: History was obtained.  Physical was performed.  Headache did improve in the ER.  Patient will follow up outpatient with her PCP. Medical or surgical consults:  None. Social determinants that affect healthcare:  None.       Amount and/or Complexity of Data Reviewed  Labs:      Details: Labs unremarkable  Radiology:      Details: CT head showed no acute findings                                      Clinical Impression:  Final diagnoses:  [R51.9] Nonintractable headache, unspecified chronicity pattern, unspecified headache type (Primary)          ED Disposition Condition    Discharge Stable          ED Prescriptions    None       Follow-up Information       Follow up With Specialties Details Why Contact Info    Your Primary Care Provider  Call in 3 days ed follow up              Florian Ames FNP  08/06/24 0691

## 2024-09-17 DIAGNOSIS — R13.10 DYSPHAGIA: ICD-10-CM

## 2024-09-17 DIAGNOSIS — K21.9 GASTROESOPHAGEAL REFLUX DISEASE: Primary | ICD-10-CM

## 2024-09-17 DIAGNOSIS — R11.0 NAUSEA: ICD-10-CM

## 2024-09-25 ENCOUNTER — HOSPITAL ENCOUNTER (OUTPATIENT)
Dept: RADIOLOGY | Facility: HOSPITAL | Age: 79
Discharge: HOME | End: 2024-09-25
Attending: INTERNAL MEDICINE
Payer: MEDICARE

## 2024-09-25 DIAGNOSIS — K21.9 GASTROESOPHAGEAL REFLUX DISEASE: ICD-10-CM

## 2024-09-25 DIAGNOSIS — R13.10 DYSPHAGIA: ICD-10-CM

## 2024-09-25 DIAGNOSIS — R11.0 NAUSEA: ICD-10-CM

## 2024-09-25 PROCEDURE — 74220 X-RAY XM ESOPHAGUS 1CNTRST: CPT | Mod: TC

## 2025-04-09 ENCOUNTER — HOSPITAL ENCOUNTER (EMERGENCY)
Facility: HOSPITAL | Age: 80
Discharge: LEFT BEFORE DISCHARGE | End: 2025-04-10
Payer: MEDICARE

## 2025-04-09 VITALS
OXYGEN SATURATION: 99 % | TEMPERATURE: 98 F | BODY MASS INDEX: 18.66 KG/M2 | DIASTOLIC BLOOD PRESSURE: 74 MMHG | RESPIRATION RATE: 16 BRPM | SYSTOLIC BLOOD PRESSURE: 135 MMHG | WEIGHT: 112 LBS | HEART RATE: 74 BPM | HEIGHT: 65 IN

## 2025-04-09 DIAGNOSIS — R06.02 SHORTNESS OF BREATH: ICD-10-CM

## 2025-04-09 LAB
ALBUMIN SERPL-MCNC: 3.9 G/DL (ref 3.4–4.8)
ALBUMIN/GLOB SERPL: 1.3 RATIO (ref 1.1–2)
ALP SERPL-CCNC: 63 UNIT/L (ref 40–150)
ALT SERPL-CCNC: 19 UNIT/L (ref 0–55)
ANION GAP SERPL CALC-SCNC: 7 MEQ/L
APTT PPP: 24.9 SECONDS (ref 23.2–33.7)
AST SERPL-CCNC: 26 UNIT/L (ref 11–45)
BASOPHILS # BLD AUTO: 0.04 X10(3)/MCL
BASOPHILS NFR BLD AUTO: 0.7 %
BILIRUB SERPL-MCNC: 0.3 MG/DL
BNP BLD-MCNC: 134.3 PG/ML
BUN SERPL-MCNC: 15.4 MG/DL (ref 9.8–20.1)
CALCIUM SERPL-MCNC: 9 MG/DL (ref 8.4–10.2)
CHLORIDE SERPL-SCNC: 110 MMOL/L (ref 98–107)
CO2 SERPL-SCNC: 25 MMOL/L (ref 23–31)
CREAT SERPL-MCNC: 0.92 MG/DL (ref 0.55–1.02)
CREAT/UREA NIT SERPL: 17
EOSINOPHIL # BLD AUTO: 0.48 X10(3)/MCL (ref 0–0.9)
EOSINOPHIL NFR BLD AUTO: 8.2 %
ERYTHROCYTE [DISTWIDTH] IN BLOOD BY AUTOMATED COUNT: 14.6 % (ref 11.5–17)
FLUAV AG UPPER RESP QL IA.RAPID: NOT DETECTED
FLUBV AG UPPER RESP QL IA.RAPID: NOT DETECTED
GFR SERPLBLD CREATININE-BSD FMLA CKD-EPI: >60 ML/MIN/1.73/M2
GLOBULIN SER-MCNC: 2.9 GM/DL (ref 2.4–3.5)
GLUCOSE SERPL-MCNC: 84 MG/DL (ref 82–115)
HCT VFR BLD AUTO: 36.3 % (ref 37–47)
HGB BLD-MCNC: 11.7 G/DL (ref 12–16)
IMM GRANULOCYTES # BLD AUTO: 0.01 X10(3)/MCL (ref 0–0.04)
IMM GRANULOCYTES NFR BLD AUTO: 0.2 %
INR PPP: 1.1
LYMPHOCYTES # BLD AUTO: 1.39 X10(3)/MCL (ref 0.6–4.6)
LYMPHOCYTES NFR BLD AUTO: 23.8 %
MCH RBC QN AUTO: 30.3 PG (ref 27–31)
MCHC RBC AUTO-ENTMCNC: 32.2 G/DL (ref 33–36)
MCV RBC AUTO: 94 FL (ref 80–94)
MONOCYTES # BLD AUTO: 0.47 X10(3)/MCL (ref 0.1–1.3)
MONOCYTES NFR BLD AUTO: 8 %
NEUTROPHILS # BLD AUTO: 3.46 X10(3)/MCL (ref 2.1–9.2)
NEUTROPHILS NFR BLD AUTO: 59.1 %
NRBC BLD AUTO-RTO: 0 %
PLATELET # BLD AUTO: 156 X10(3)/MCL (ref 130–400)
PMV BLD AUTO: 10.6 FL (ref 7.4–10.4)
POTASSIUM SERPL-SCNC: 4.1 MMOL/L (ref 3.5–5.1)
PROT SERPL-MCNC: 6.8 GM/DL (ref 5.8–7.6)
PROTHROMBIN TIME: 13.6 SECONDS (ref 12.5–14.5)
RBC # BLD AUTO: 3.86 X10(6)/MCL (ref 4.2–5.4)
SARS-COV-2 RNA RESP QL NAA+PROBE: NOT DETECTED
SODIUM SERPL-SCNC: 142 MMOL/L (ref 136–145)
TROPONIN I SERPL-MCNC: <0.01 NG/ML (ref 0–0.04)
TROPONIN I SERPL-MCNC: <0.01 NG/ML (ref 0–0.04)
WBC # BLD AUTO: 5.85 X10(3)/MCL (ref 4.5–11.5)

## 2025-04-09 PROCEDURE — 85730 THROMBOPLASTIN TIME PARTIAL: CPT | Performed by: NURSE PRACTITIONER

## 2025-04-09 PROCEDURE — 84484 ASSAY OF TROPONIN QUANT: CPT | Performed by: NURSE PRACTITIONER

## 2025-04-09 PROCEDURE — 99285 EMERGENCY DEPT VISIT HI MDM: CPT | Mod: 25

## 2025-04-09 PROCEDURE — 80053 COMPREHEN METABOLIC PANEL: CPT | Performed by: NURSE PRACTITIONER

## 2025-04-09 PROCEDURE — 93010 ELECTROCARDIOGRAM REPORT: CPT | Mod: ,,, | Performed by: INTERNAL MEDICINE

## 2025-04-09 PROCEDURE — 83880 ASSAY OF NATRIURETIC PEPTIDE: CPT | Performed by: NURSE PRACTITIONER

## 2025-04-09 PROCEDURE — 93005 ELECTROCARDIOGRAM TRACING: CPT

## 2025-04-09 PROCEDURE — 84484 ASSAY OF TROPONIN QUANT: CPT | Performed by: STUDENT IN AN ORGANIZED HEALTH CARE EDUCATION/TRAINING PROGRAM

## 2025-04-09 PROCEDURE — 0240U COVID/FLU A&B PCR: CPT | Performed by: NURSE PRACTITIONER

## 2025-04-09 PROCEDURE — 85610 PROTHROMBIN TIME: CPT | Performed by: NURSE PRACTITIONER

## 2025-04-09 PROCEDURE — 85025 COMPLETE CBC W/AUTO DIFF WBC: CPT | Performed by: NURSE PRACTITIONER

## 2025-04-09 NOTE — FIRST PROVIDER EVALUATION
Medical screening examination initiated.  I have conducted a focused provider triage encounter, findings are as follows:    Brief history of present illness:  Patient states SOB x 2 weeks. States coughing and congestion. Denies any SOB.     There were no vitals filed for this visit.    Pertinent physical exam:  Awake, alert, ambulatory      Brief workup plan:  Labs, EKG, Imaging      Preliminary workup initiated; this workup will be continued and followed by the physician or advanced practice provider that is assigned to the patient when roomed.

## 2025-04-10 LAB
OHS QRS DURATION: 84 MS
OHS QTC CALCULATION: 425 MS

## 2025-07-03 ENCOUNTER — HOSPITAL ENCOUNTER (EMERGENCY)
Facility: HOSPITAL | Age: 80
Discharge: HOME | End: 2025-07-03
Attending: EMERGENCY MEDICINE
Payer: MEDICARE

## 2025-07-03 VITALS
HEART RATE: 70 BPM | BODY MASS INDEX: 19.16 KG/M2 | RESPIRATION RATE: 18 BRPM | OXYGEN SATURATION: 99 % | WEIGHT: 115 LBS | DIASTOLIC BLOOD PRESSURE: 84 MMHG | TEMPERATURE: 99 F | SYSTOLIC BLOOD PRESSURE: 165 MMHG | HEIGHT: 65 IN

## 2025-07-03 DIAGNOSIS — R20.2 FACIAL TINGLING: ICD-10-CM

## 2025-07-03 LAB
ALBUMIN SERPL-MCNC: 3.9 G/DL (ref 3.4–4.8)
ALBUMIN/GLOB SERPL: 1.1 RATIO (ref 1.1–2)
ALP SERPL-CCNC: 55 UNIT/L (ref 40–150)
ALT SERPL-CCNC: 14 UNIT/L (ref 0–55)
ANION GAP SERPL CALC-SCNC: 7 MEQ/L
AST SERPL-CCNC: 20 UNIT/L (ref 11–45)
BACTERIA #/AREA URNS AUTO: NORMAL /HPF
BASOPHILS # BLD AUTO: 0.04 X10(3)/MCL
BASOPHILS NFR BLD AUTO: 0.6 %
BILIRUB SERPL-MCNC: 0.4 MG/DL
BILIRUB UR QL STRIP.AUTO: NEGATIVE
BUN SERPL-MCNC: 18.6 MG/DL (ref 9.8–20.1)
CALCIUM SERPL-MCNC: 9.6 MG/DL (ref 8.4–10.2)
CHLORIDE SERPL-SCNC: 109 MMOL/L (ref 98–107)
CLARITY UR: CLEAR
CO2 SERPL-SCNC: 29 MMOL/L (ref 23–31)
COLOR UR AUTO: COLORLESS
CREAT SERPL-MCNC: 1.14 MG/DL (ref 0.55–1.02)
CREAT/UREA NIT SERPL: 16
EOSINOPHIL # BLD AUTO: 0.42 X10(3)/MCL (ref 0–0.9)
EOSINOPHIL NFR BLD AUTO: 6.1 %
ERYTHROCYTE [DISTWIDTH] IN BLOOD BY AUTOMATED COUNT: 14.3 % (ref 11.5–17)
GFR SERPLBLD CREATININE-BSD FMLA CKD-EPI: 50 ML/MIN/1.73/M2
GLOBULIN SER-MCNC: 3.6 GM/DL (ref 2.4–3.5)
GLUCOSE SERPL-MCNC: 89 MG/DL (ref 82–115)
GLUCOSE UR QL STRIP: NORMAL
HCT VFR BLD AUTO: 38.9 % (ref 37–47)
HGB BLD-MCNC: 12.4 G/DL (ref 12–16)
HGB UR QL STRIP: NEGATIVE
IMM GRANULOCYTES # BLD AUTO: 0.02 X10(3)/MCL (ref 0–0.04)
IMM GRANULOCYTES NFR BLD AUTO: 0.3 %
KETONES UR QL STRIP: NEGATIVE
LEUKOCYTE ESTERASE UR QL STRIP: NEGATIVE
LYMPHOCYTES # BLD AUTO: 1.58 X10(3)/MCL (ref 0.6–4.6)
LYMPHOCYTES NFR BLD AUTO: 22.8 %
MAGNESIUM SERPL-MCNC: 2.1 MG/DL (ref 1.6–2.6)
MCH RBC QN AUTO: 30.2 PG (ref 27–31)
MCHC RBC AUTO-ENTMCNC: 31.9 G/DL (ref 33–36)
MCV RBC AUTO: 94.6 FL (ref 80–94)
MONOCYTES # BLD AUTO: 0.47 X10(3)/MCL (ref 0.1–1.3)
MONOCYTES NFR BLD AUTO: 6.8 %
NEUTROPHILS # BLD AUTO: 4.41 X10(3)/MCL (ref 2.1–9.2)
NEUTROPHILS NFR BLD AUTO: 63.4 %
NITRITE UR QL STRIP: NEGATIVE
NRBC BLD AUTO-RTO: 0 %
OHS QRS DURATION: 88 MS
OHS QTC CALCULATION: 429 MS
PH UR STRIP: 7.5 [PH]
PLATELET # BLD AUTO: 152 X10(3)/MCL (ref 130–400)
PMV BLD AUTO: 10.5 FL (ref 7.4–10.4)
POTASSIUM SERPL-SCNC: 4.3 MMOL/L (ref 3.5–5.1)
PROT SERPL-MCNC: 7.5 GM/DL (ref 5.8–7.6)
PROT UR QL STRIP: NEGATIVE
RBC # BLD AUTO: 4.11 X10(6)/MCL (ref 4.2–5.4)
RBC #/AREA URNS AUTO: NORMAL /HPF
SODIUM SERPL-SCNC: 145 MMOL/L (ref 136–145)
SP GR UR STRIP.AUTO: 1.01 (ref 1–1.03)
SQUAMOUS #/AREA URNS LPF: NORMAL /HPF
TROPONIN I SERPL-MCNC: <0.01 NG/ML (ref 0–0.04)
UROBILINOGEN UR STRIP-ACNC: NORMAL
WBC # BLD AUTO: 6.94 X10(3)/MCL (ref 4.5–11.5)
WBC #/AREA URNS AUTO: NORMAL /HPF

## 2025-07-03 PROCEDURE — 93005 ELECTROCARDIOGRAM TRACING: CPT

## 2025-07-03 PROCEDURE — 80053 COMPREHEN METABOLIC PANEL: CPT | Performed by: PHYSICIAN ASSISTANT

## 2025-07-03 PROCEDURE — 83735 ASSAY OF MAGNESIUM: CPT | Performed by: PHYSICIAN ASSISTANT

## 2025-07-03 PROCEDURE — 85025 COMPLETE CBC W/AUTO DIFF WBC: CPT | Performed by: PHYSICIAN ASSISTANT

## 2025-07-03 PROCEDURE — 81001 URINALYSIS AUTO W/SCOPE: CPT | Performed by: PHYSICIAN ASSISTANT

## 2025-07-03 PROCEDURE — 84484 ASSAY OF TROPONIN QUANT: CPT | Performed by: PHYSICIAN ASSISTANT

## 2025-07-03 PROCEDURE — 99285 EMERGENCY DEPT VISIT HI MDM: CPT | Mod: 25

## 2025-07-03 PROCEDURE — 93010 ELECTROCARDIOGRAM REPORT: CPT | Mod: ,,, | Performed by: INTERNAL MEDICINE

## 2025-07-03 RX ORDER — ACETAMINOPHEN 325 MG/1
650 TABLET ORAL
Status: DISCONTINUED | OUTPATIENT
Start: 2025-07-03 | End: 2025-07-03 | Stop reason: HOSPADM

## 2025-07-03 NOTE — DISCHARGE INSTRUCTIONS
Use Tylenol and ibuprofen as needed for pain and swelling.  Follow up with PCP/neurology for further evaluation of facial tingling.

## 2025-07-03 NOTE — Clinical Note
"Hafsa Ballesterosjoselin Jimenez was seen and treated in our emergency department on 7/3/2025.  She may return to work on 07/07/2025.       If you have any questions or concerns, please don't hesitate to call.      Caren Boone PA"

## 2025-07-03 NOTE — ED PROVIDER NOTES
Encounter Date: 7/3/2025       History     Chief Complaint   Patient presents with    Tingling     C/o intermittent R sided facial tingling x 1 month. States the tingling has not resolved in 3 days. No unilateral weakness, slurred speech, or facial droop noted. Equal sensation on L and R side of face. GCS 15.     79-year-old female presents to ED for evaluation of intermittent right-sided facial tingling and dullness over the last month.  Patient reports that it comes and goes.  States that however over the last 3 days she has had consistent dullness to the right side of her face.  Reports that she woke up today and she had a severe sharp headache with a dullness in her face causing her to be concerned.  States that her symptoms started after having a dental procedure.  States she has followed up with a dentist and was placed on antibiotics for sinusitis. Patient reports she has been under more stress than normal due to family issues.     The history is provided by the patient. No  was used.     Review of patient's allergies indicates:   Allergen Reactions    Iodine      Iodine containing compounds (Severe)    Ezetimibe     Vancomycin      Past Medical History:   Diagnosis Date    Abdominal distention     (gaseous)    Acute ischemic heart disease     Acute posthemorrhagic anemia     Arthritis     Atherosclerotic heart disease of native coronary artery without angina pectoris     Atrial flutter, unspecified     CAD in native artery     CVA (cerebral vascular accident)     Hx    Diverticulosis of large intestine without perforation or abscess without bleeding     Edema, unspecified     Gas pain     Gastritis, unspecified, without bleeding     Gastro-esophageal reflux disease without esophagitis     GERD (gastroesophageal reflux disease)     Chronic    Hemorrhage of anus and rectum     History of mitral valve replacement with bioprosthetic valve     Hyperlipemia     unspecified    Hypertension      Internal hemorrhoids without complication     Nausea     Personal history of colonic polyps     Rectal polyp     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     Rheumatic disease of mitral valve     S/P CABG x 2     Unspecified abdominal pain     Vitamin D deficiency      Past Surgical History:   Procedure Laterality Date    APPENDECTOMY      COLONOSCOPY      COLONOSCOPY N/A 10/11/2022    Procedure: COLON;  Surgeon: Manish Perez MD;  Location: [facility] ENDOSCOPY;  Service: Gastroenterology;  Laterality: N/A;    EGD with small bowel enteroscopy  11/19/2018    ESOPHAGOGASTRODUODENOSCOPY  11/19/2018    HERNIA REPAIR  1969    HYSTERECTOMY  1982    LEFT HEART CATHETERIZATION      Open heart surgery       Family History   Problem Relation Name Age of Onset    Hypertension Mother      Arthritis Mother      Glaucoma Father      Arthritis Father      Hypertension Other          Sibling    Thyroid disease Other          Sibling     Social History[1]  Review of Systems   Constitutional:  Negative for diaphoresis and fever.   HENT:  Negative for congestion, rhinorrhea and sore throat.    Respiratory:  Negative for shortness of breath.    Cardiovascular:  Positive for chest pain. Negative for palpitations and leg swelling.   Gastrointestinal:  Negative for abdominal pain, constipation, diarrhea, nausea and vomiting.   Genitourinary: Negative.    Musculoskeletal: Negative.    Skin: Negative.    Neurological:  Positive for numbness. Negative for dizziness, syncope, facial asymmetry, weakness and light-headedness.   All other systems reviewed and are negative.      Physical Exam     Initial Vitals [07/03/25 1059]   BP Pulse Resp Temp SpO2   (!) 159/69 72 18 98.7 °F (37.1 °C) 99 %      MAP       --         Physical Exam    Nursing note and vitals reviewed.  Constitutional: She appears well-developed and well-nourished.   HENT:   Head: Normocephalic and atraumatic.   Right Ear: Tympanic membrane and external ear normal.   Left Ear:  Tympanic membrane and external ear normal. Mouth/Throat: Uvula is midline, oropharynx is clear and moist and mucous membranes are normal. No trismus in the jaw. No uvula swelling. No oropharyngeal exudate, posterior oropharyngeal edema or posterior oropharyngeal erythema.   Eyes: Conjunctivae are normal. Pupils are equal, round, and reactive to light.   Neck: Neck supple.   Normal range of motion.  Cardiovascular:  Normal rate, regular rhythm and normal heart sounds.           Pulmonary/Chest: Breath sounds normal. She has no wheezes. She has no rhonchi. She has no rales.   Abdominal: Abdomen is soft. Bowel sounds are normal. There is no abdominal tenderness. There is no rebound and no guarding.   Musculoskeletal:         General: Normal range of motion.      Cervical back: Normal range of motion and neck supple.     Neurological: She is alert and oriented to person, place, and time. She has normal strength. No cranial nerve deficit or sensory deficit. GCS score is 15. GCS eye subscore is 4. GCS verbal subscore is 5. GCS motor subscore is 6.   Skin: Skin is warm and dry.   Psychiatric: She has a normal mood and affect.         ED Course   Procedures  Labs Reviewed   COMPREHENSIVE METABOLIC PANEL - Abnormal       Result Value    Sodium 145      Potassium 4.3      Chloride 109 (*)     CO2 29      Glucose 89      Blood Urea Nitrogen 18.6      Creatinine 1.14 (*)     Calcium 9.6      Protein Total 7.5      Albumin 3.9      Globulin 3.6 (*)     Albumin/Globulin Ratio 1.1      Bilirubin Total 0.4      ALP 55      ALT 14      AST 20      eGFR 50      Anion Gap 7.0      BUN/Creatinine Ratio 16     CBC WITH DIFFERENTIAL - Abnormal    WBC 6.94      RBC 4.11 (*)     Hgb 12.4      Hct 38.9      MCV 94.6 (*)     MCH 30.2      MCHC 31.9 (*)     RDW 14.3      Platelet 152      MPV 10.5 (*)     Neut % 63.4      Lymph % 22.8      Mono % 6.8      Eos % 6.1      Basophil % 0.6      Imm Grans % 0.3      Neut # 4.41      Lymph # 1.58       Mono # 0.47      Eos # 0.42      Baso # 0.04      Imm Gran # 0.02      NRBC% 0.0     TROPONIN I - Normal    Troponin-I <0.010     MAGNESIUM - Normal    Magnesium Level 2.10     URINALYSIS, REFLEX TO URINE CULTURE - Normal    Color, UA Colorless      Appearance, UA Clear      Specific Gravity, UA 1.007      pH, UA 7.5      Protein, UA Negative      Glucose, UA Normal      Ketones, UA Negative      Blood, UA Negative      Bilirubin, UA Negative      Urobilinogen, UA Normal      Nitrites, UA Negative      Leukocyte Esterase, UA Negative      RBC, UA None Seen      WBC, UA None Seen      Bacteria, UA None Seen      Squamous Epithelial Cells, UA Trace     CBC W/ AUTO DIFFERENTIAL    Narrative:     The following orders were created for panel order CBC auto differential.  Procedure                               Abnormality         Status                     ---------                               -----------         ------                     CBC with Differential[[id number]]       Abnormal            Final result                 Please view results for these tests on the individual orders.          Imaging Results              CT Head Without Contrast (Final result)  Result time 07/03/25 11:30:35      Final result by Wilfredo Fernandez MD (07/03/25 11:30:35)                   Impression:      No acute intracranial findings.      Electronically signed by: Wilfredo Fernandez  Date:    07/03/2025  Time:    11:30               Narrative:    EXAMINATION:  CT HEAD WITHOUT CONTRAST    CLINICAL HISTORY:  Headache, new or worsening (Age >= 50y);    TECHNIQUE:  CT imaging of the head performed from the skull base to the vertex without intravenous contrast.   mGycm. Automatic exposure control, adjustment of mA/kV or iterative reconstruction technique was used to reduce radiation.    COMPARISON:  6 August 2024    FINDINGS:  There is no acute cortical infarct, hemorrhage or mass lesion.  No new parenchymal attenuation abnormality.   Ventricular size is stable.  There are vascular calcifications.    Visualized paranasal sinuses and mastoid air cells are clear.                                       Medications   acetaminophen tablet 650 mg (has no administration in time range)     Medical Decision Making  79-year-old female presents to ED for evaluation of intermittent right-sided facial tingling and dullness over the last month.  Patient reports that it comes and goes.  States that however over the last 3 days she has had consistent dullness to the right side of her face.  Reports that she woke up today and she had a severe sharp headache with a dullness in her face causing her to be concerned.  States that her symptoms started after having a dental procedure.  States she has followed up with a dentist and was placed on antibiotics for sinusitis. Patient reports she has been under more stress than normal due to family issues.     Differential diagnosis includes but isn't limited to CVA, TIA, nerve palsy, sinus infection, vertigo, CAD, STEMI, NSTEMI, electrolyte abnormality    Amount and/or Complexity of Data Reviewed  Labs: ordered. Decision-making details documented in ED Course.  Radiology: ordered.  Discussion of management or test interpretation with external provider(s): Patient GCS 15 and neuro intact.  Ambulatory with steady gait presents to ED for evaluation of facial tingling.  States it has been ongoing intermittently over the last month.  States she notices that has gotten worse over the last several days.  Reports that she has been under increased stress due to family issues.  CT head negative.  Labs unremarkable.  Patient has no sensation differences to her face at this time.  No weakness.  No facial droop.  No indication for admission at this time.  Discussed follow up with her PCP.  Discussed follow-up with neurology as needed.  Discussed return to ED precautions.  Patient verbalizes understanding and agrees with plan of  care.    Risk  OTC drugs.               ED Course as of 07/03/25 1402   Thu Jul 03, 2025   1213 WBC: 6.94 [SL]   1213 Hemoglobin: 12.4 [SL]   1213 Hematocrit: 38.9 [SL]      ED Course User Index  [SL] Caren Boone PA                           Clinical Impression:  Final diagnoses:  [R20.2] Facial tingling          ED Disposition Condition    Discharge Stable          ED Prescriptions    None       Follow-up Information       Follow up With Specialties Details Why Contact Info    PCP  In 1 week As needed, If you do not have a PCP you may call [phone] to help get set up If you do not have a PCP you may call [phone] to help get set up.    Dakota Hsu MD Neurology, Sleep Medicine   [address] [place] [address] Dr Zabala [address] [place] [id number] [phone]                     [1]   Social History  Tobacco Use    Smoking status: Never    Smokeless tobacco: Never   Substance Use Topics    Alcohol use: Not Currently    Drug use: Not Currently        Caren Boone PA  07/03/25 1402

## (undated) DEVICE — KIT SURGICAL COLON .25 1.1OZ

## (undated) DEVICE — CONTAINER SPECIMEN SCREW 4OZ

## (undated) DEVICE — SOL IRRI STRL WATER 1000ML

## (undated) DEVICE — TIP SUCTION YANKAUER

## (undated) DEVICE — ADAPTER DUAL NSL LUER M-M 7FT

## (undated) DEVICE — TUBING O2 FEMALE CONN 13FT

## (undated) DEVICE — FORCEP BX LG CAP 2.8MMX240CM

## (undated) DEVICE — COLLECTION SPECIMEN NEPTUNE

## (undated) DEVICE — UNDERPAD DISPOSABLE 30X30IN

## (undated) DEVICE — KIT CANIST SUCTION 1200CC

## (undated) DEVICE — BAG LABGUARD BIOHAZARD 6X9IN

## (undated) DEVICE — BLOCK BLOX BITE DENT RIM 54FR